# Patient Record
Sex: MALE | Race: BLACK OR AFRICAN AMERICAN | NOT HISPANIC OR LATINO | Employment: OTHER | URBAN - METROPOLITAN AREA
[De-identification: names, ages, dates, MRNs, and addresses within clinical notes are randomized per-mention and may not be internally consistent; named-entity substitution may affect disease eponyms.]

---

## 2025-07-07 ENCOUNTER — HOSPITAL ENCOUNTER (INPATIENT)
Facility: HOSPITAL | Age: 85
LOS: 3 days | Discharge: NURSING, REHAB OR LONG-TERM CARE | DRG: 291 | End: 2025-07-11
Attending: EMERGENCY MEDICINE | Admitting: INTERNAL MEDICINE
Payer: MEDICARE

## 2025-07-07 ENCOUNTER — APPOINTMENT (OUTPATIENT)
Dept: RADIOLOGY | Facility: HOSPITAL | Age: 85
DRG: 291 | End: 2025-07-07
Payer: MEDICARE

## 2025-07-07 ENCOUNTER — APPOINTMENT (OUTPATIENT)
Dept: CARDIOLOGY | Facility: HOSPITAL | Age: 85
DRG: 291 | End: 2025-07-07
Payer: MEDICARE

## 2025-07-07 DIAGNOSIS — I50.9 ACUTE ON CHRONIC CONGESTIVE HEART FAILURE, UNSPECIFIED HEART FAILURE TYPE: Primary | ICD-10-CM

## 2025-07-07 DIAGNOSIS — I42.9 CARDIOMYOPATHY, UNSPECIFIED TYPE (MULTI): ICD-10-CM

## 2025-07-07 LAB
ALBUMIN SERPL BCP-MCNC: 3.7 G/DL (ref 3.4–5)
ALP SERPL-CCNC: 48 U/L (ref 33–136)
ALT SERPL W P-5'-P-CCNC: 21 U/L (ref 10–52)
ANION GAP SERPL CALCULATED.3IONS-SCNC: 13 MMOL/L (ref 10–20)
AST SERPL W P-5'-P-CCNC: 18 U/L (ref 9–39)
BASOPHILS # BLD AUTO: 0.07 X10*3/UL (ref 0–0.1)
BASOPHILS NFR BLD AUTO: 0.6 %
BILIRUB SERPL-MCNC: 0.7 MG/DL (ref 0–1.2)
BNP SERPL-MCNC: 1241 PG/ML (ref 0–99)
BUN SERPL-MCNC: 19 MG/DL (ref 6–23)
CALCIUM SERPL-MCNC: 9.1 MG/DL (ref 8.6–10.3)
CARDIAC TROPONIN I PNL SERPL HS: 59 NG/L (ref 0–20)
CARDIAC TROPONIN I PNL SERPL HS: 60 NG/L (ref 0–20)
CHLORIDE SERPL-SCNC: 103 MMOL/L (ref 98–107)
CO2 SERPL-SCNC: 27 MMOL/L (ref 21–32)
CREAT SERPL-MCNC: 1.02 MG/DL (ref 0.5–1.3)
EGFRCR SERPLBLD CKD-EPI 2021: 72 ML/MIN/1.73M*2
EOSINOPHIL # BLD AUTO: 0.12 X10*3/UL (ref 0–0.4)
EOSINOPHIL NFR BLD AUTO: 1.1 %
ERYTHROCYTE [DISTWIDTH] IN BLOOD BY AUTOMATED COUNT: 15 % (ref 11.5–14.5)
GLUCOSE SERPL-MCNC: 100 MG/DL (ref 74–99)
HCT VFR BLD AUTO: 41.2 % (ref 41–52)
HGB BLD-MCNC: 12.8 G/DL (ref 13.5–17.5)
IMM GRANULOCYTES # BLD AUTO: 0.04 X10*3/UL (ref 0–0.5)
IMM GRANULOCYTES NFR BLD AUTO: 0.4 % (ref 0–0.9)
LYMPHOCYTES # BLD AUTO: 1.58 X10*3/UL (ref 0.8–3)
LYMPHOCYTES NFR BLD AUTO: 14.2 %
MAGNESIUM SERPL-MCNC: 1.94 MG/DL (ref 1.6–2.4)
MCH RBC QN AUTO: 26.7 PG (ref 26–34)
MCHC RBC AUTO-ENTMCNC: 31.1 G/DL (ref 32–36)
MCV RBC AUTO: 86 FL (ref 80–100)
MONOCYTES # BLD AUTO: 1.23 X10*3/UL (ref 0.05–0.8)
MONOCYTES NFR BLD AUTO: 11.1 %
NEUTROPHILS # BLD AUTO: 8.09 X10*3/UL (ref 1.6–5.5)
NEUTROPHILS NFR BLD AUTO: 72.6 %
NRBC BLD-RTO: 0 /100 WBCS (ref 0–0)
PLATELET # BLD AUTO: 345 X10*3/UL (ref 150–450)
POTASSIUM SERPL-SCNC: 3.9 MMOL/L (ref 3.5–5.3)
PROT SERPL-MCNC: 7.4 G/DL (ref 6.4–8.2)
RBC # BLD AUTO: 4.8 X10*6/UL (ref 4.5–5.9)
SODIUM SERPL-SCNC: 139 MMOL/L (ref 136–145)
WBC # BLD AUTO: 11.1 X10*3/UL (ref 4.4–11.3)

## 2025-07-07 PROCEDURE — 85025 COMPLETE CBC W/AUTO DIFF WBC: CPT

## 2025-07-07 PROCEDURE — 83735 ASSAY OF MAGNESIUM: CPT

## 2025-07-07 PROCEDURE — 84484 ASSAY OF TROPONIN QUANT: CPT

## 2025-07-07 PROCEDURE — 71045 X-RAY EXAM CHEST 1 VIEW: CPT

## 2025-07-07 PROCEDURE — 83880 ASSAY OF NATRIURETIC PEPTIDE: CPT

## 2025-07-07 PROCEDURE — 99285 EMERGENCY DEPT VISIT HI MDM: CPT | Performed by: EMERGENCY MEDICINE

## 2025-07-07 PROCEDURE — 93005 ELECTROCARDIOGRAM TRACING: CPT

## 2025-07-07 PROCEDURE — 36415 COLL VENOUS BLD VENIPUNCTURE: CPT

## 2025-07-07 PROCEDURE — 80053 COMPREHEN METABOLIC PANEL: CPT

## 2025-07-07 RX ORDER — FUROSEMIDE 10 MG/ML
40 INJECTION INTRAMUSCULAR; INTRAVENOUS ONCE
Status: COMPLETED | OUTPATIENT
Start: 2025-07-07 | End: 2025-07-08

## 2025-07-07 ASSESSMENT — PAIN - FUNCTIONAL ASSESSMENT: PAIN_FUNCTIONAL_ASSESSMENT: 0-10

## 2025-07-07 ASSESSMENT — PAIN SCALES - GENERAL: PAINLEVEL_OUTOF10: 0 - NO PAIN

## 2025-07-07 NOTE — ED TRIAGE NOTES
Pt to ED via EMS for SOB that started this morning, bilateral leg swelling for past few days and increased weakness and falls for the past few weeks. Pt lives at home with family. Pt was 95% RA. No headache, ear/sinus/throat/chest/abdominal pain, cough, nausea, vomiting, diarrhea, fever, chills, body aches. Some painful urination. Pt with some discharge in right eye that is baseline due to history of operation. Pt said he fell out of bed a few times lately but did not hit head; making jokes in triage.

## 2025-07-08 ENCOUNTER — APPOINTMENT (OUTPATIENT)
Dept: CARDIOLOGY | Facility: HOSPITAL | Age: 85
DRG: 291 | End: 2025-07-08
Payer: MEDICARE

## 2025-07-08 PROBLEM — I50.9 ACUTE ON CHRONIC CONGESTIVE HEART FAILURE, UNSPECIFIED HEART FAILURE TYPE: Status: ACTIVE | Noted: 2025-07-08

## 2025-07-08 LAB
CHOLEST SERPL-MCNC: 126 MG/DL (ref 0–199)
CHOLEST/HDLC SERPL: 2.6 {RATIO}
HDLC SERPL-MCNC: 48.2 MG/DL
HOLD SPECIMEN: NORMAL
LDLC SERPL CALC-MCNC: 66 MG/DL
NON HDL CHOLESTEROL: 78 MG/DL (ref 0–149)
TRIGL SERPL-MCNC: 60 MG/DL (ref 0–149)
TSH SERPL-ACNC: 0.91 MIU/L (ref 0.44–3.98)
VLDL: 12 MG/DL (ref 0–40)

## 2025-07-08 PROCEDURE — 96374 THER/PROPH/DIAG INJ IV PUSH: CPT

## 2025-07-08 PROCEDURE — 80061 LIPID PANEL: CPT | Performed by: INTERNAL MEDICINE

## 2025-07-08 PROCEDURE — 84443 ASSAY THYROID STIM HORMONE: CPT | Performed by: INTERNAL MEDICINE

## 2025-07-08 PROCEDURE — 1200000002 HC GENERAL ROOM WITH TELEMETRY DAILY

## 2025-07-08 PROCEDURE — 2500000001 HC RX 250 WO HCPCS SELF ADMINISTERED DRUGS (ALT 637 FOR MEDICARE OP): Performed by: INTERNAL MEDICINE

## 2025-07-08 PROCEDURE — 2500000004 HC RX 250 GENERAL PHARMACY W/ HCPCS (ALT 636 FOR OP/ED): Mod: JW | Performed by: INTERNAL MEDICINE

## 2025-07-08 PROCEDURE — 2500000001 HC RX 250 WO HCPCS SELF ADMINISTERED DRUGS (ALT 637 FOR MEDICARE OP): Performed by: NURSE PRACTITIONER

## 2025-07-08 PROCEDURE — 36415 COLL VENOUS BLD VENIPUNCTURE: CPT | Performed by: INTERNAL MEDICINE

## 2025-07-08 PROCEDURE — 2500000004 HC RX 250 GENERAL PHARMACY W/ HCPCS (ALT 636 FOR OP/ED)

## 2025-07-08 PROCEDURE — 97165 OT EVAL LOW COMPLEX 30 MIN: CPT | Mod: GO

## 2025-07-08 PROCEDURE — 99232 SBSQ HOSP IP/OBS MODERATE 35: CPT | Performed by: INTERNAL MEDICINE

## 2025-07-08 PROCEDURE — C8929 TTE W OR WO FOL WCON,DOPPLER: HCPCS

## 2025-07-08 PROCEDURE — 2500000004 HC RX 250 GENERAL PHARMACY W/ HCPCS (ALT 636 FOR OP/ED): Performed by: INTERNAL MEDICINE

## 2025-07-08 PROCEDURE — 99223 1ST HOSP IP/OBS HIGH 75: CPT | Performed by: INTERNAL MEDICINE

## 2025-07-08 PROCEDURE — 97161 PT EVAL LOW COMPLEX 20 MIN: CPT | Mod: GP | Performed by: PHYSICAL THERAPIST

## 2025-07-08 RX ORDER — CLOPIDOGREL BISULFATE 75 MG/1
75 TABLET ORAL DAILY
COMMUNITY

## 2025-07-08 RX ORDER — FUROSEMIDE 10 MG/ML
40 INJECTION INTRAMUSCULAR; INTRAVENOUS DAILY
Status: DISCONTINUED | OUTPATIENT
Start: 2025-07-08 | End: 2025-07-09

## 2025-07-08 RX ORDER — CARVEDILOL 3.12 MG/1
3.12 TABLET ORAL
Status: DISCONTINUED | OUTPATIENT
Start: 2025-07-08 | End: 2025-07-11 | Stop reason: HOSPADM

## 2025-07-08 RX ORDER — ACETAMINOPHEN 500 MG
5 TABLET ORAL NIGHTLY PRN
Status: DISCONTINUED | OUTPATIENT
Start: 2025-07-08 | End: 2025-07-11 | Stop reason: HOSPADM

## 2025-07-08 RX ORDER — ONDANSETRON 4 MG/1
4 TABLET, ORALLY DISINTEGRATING ORAL EVERY 8 HOURS PRN
Status: DISCONTINUED | OUTPATIENT
Start: 2025-07-08 | End: 2025-07-11 | Stop reason: HOSPADM

## 2025-07-08 RX ORDER — AMLODIPINE BESYLATE 5 MG/1
5 TABLET ORAL DAILY
COMMUNITY
End: 2025-07-11 | Stop reason: HOSPADM

## 2025-07-08 RX ORDER — ONDANSETRON HYDROCHLORIDE 2 MG/ML
4 INJECTION, SOLUTION INTRAVENOUS EVERY 6 HOURS PRN
Status: DISCONTINUED | OUTPATIENT
Start: 2025-07-08 | End: 2025-07-11 | Stop reason: HOSPADM

## 2025-07-08 RX ORDER — CHOLECALCIFEROL (VITAMIN D3) 50 MCG
50 TABLET ORAL DAILY
COMMUNITY

## 2025-07-08 RX ORDER — ROSUVASTATIN CALCIUM 5 MG/1
5 TABLET, COATED ORAL DAILY
COMMUNITY

## 2025-07-08 RX ORDER — FINASTERIDE 5 MG/1
5 TABLET, FILM COATED ORAL DAILY
COMMUNITY

## 2025-07-08 RX ORDER — HEPARIN SODIUM 5000 [USP'U]/ML
5000 INJECTION, SOLUTION INTRAVENOUS; SUBCUTANEOUS 2 TIMES DAILY
Status: DISCONTINUED | OUTPATIENT
Start: 2025-07-08 | End: 2025-07-11 | Stop reason: HOSPADM

## 2025-07-08 RX ORDER — ASPIRIN 81 MG/1
81 TABLET ORAL DAILY
COMMUNITY

## 2025-07-08 RX ORDER — TAMSULOSIN HYDROCHLORIDE 0.4 MG/1
0.4 CAPSULE ORAL DAILY
COMMUNITY

## 2025-07-08 RX ORDER — LISINOPRIL 2.5 MG/1
2.5 TABLET ORAL DAILY
Status: DISCONTINUED | OUTPATIENT
Start: 2025-07-08 | End: 2025-07-11 | Stop reason: HOSPADM

## 2025-07-08 RX ORDER — ALUMINUM HYDROXIDE, MAGNESIUM HYDROXIDE, AND SIMETHICONE 1200; 120; 1200 MG/30ML; MG/30ML; MG/30ML
30 SUSPENSION ORAL 4 TIMES DAILY PRN
Status: DISCONTINUED | OUTPATIENT
Start: 2025-07-08 | End: 2025-07-11 | Stop reason: HOSPADM

## 2025-07-08 RX ORDER — ACETAMINOPHEN 325 MG/1
650 TABLET ORAL EVERY 6 HOURS PRN
Status: DISCONTINUED | OUTPATIENT
Start: 2025-07-08 | End: 2025-07-11 | Stop reason: HOSPADM

## 2025-07-08 RX ADMIN — CARVEDILOL 3.12 MG: 3.12 TABLET, FILM COATED ORAL at 09:17

## 2025-07-08 RX ADMIN — FUROSEMIDE 40 MG: 10 INJECTION, SOLUTION INTRAMUSCULAR; INTRAVENOUS at 09:15

## 2025-07-08 RX ADMIN — LISINOPRIL 2.5 MG: 2.5 TABLET ORAL at 09:15

## 2025-07-08 RX ADMIN — FUROSEMIDE 40 MG: 10 INJECTION, SOLUTION INTRAMUSCULAR; INTRAVENOUS at 00:14

## 2025-07-08 RX ADMIN — ACETAMINOPHEN 650 MG: 325 TABLET ORAL at 09:24

## 2025-07-08 RX ADMIN — HEPARIN SODIUM 5000 UNITS: 5000 INJECTION, SOLUTION INTRAVENOUS; SUBCUTANEOUS at 09:15

## 2025-07-08 RX ADMIN — PERFLUTREN 2 ML OF DILUTION: 6.52 INJECTION, SUSPENSION INTRAVENOUS at 13:43

## 2025-07-08 RX ADMIN — CARVEDILOL 3.12 MG: 3.12 TABLET, FILM COATED ORAL at 15:45

## 2025-07-08 RX ADMIN — HEPARIN SODIUM 5000 UNITS: 5000 INJECTION, SOLUTION INTRAVENOUS; SUBCUTANEOUS at 21:01

## 2025-07-08 RX ADMIN — Medication 5 MG: at 21:01

## 2025-07-08 SDOH — ECONOMIC STABILITY: INCOME INSECURITY: IN THE PAST 12 MONTHS HAS THE ELECTRIC, GAS, OIL, OR WATER COMPANY THREATENED TO SHUT OFF SERVICES IN YOUR HOME?: NO

## 2025-07-08 SDOH — SOCIAL STABILITY: SOCIAL NETWORK
DO YOU BELONG TO ANY CLUBS OR ORGANIZATIONS SUCH AS CHURCH GROUPS, UNIONS, FRATERNAL OR ATHLETIC GROUPS, OR SCHOOL GROUPS?: NO

## 2025-07-08 SDOH — SOCIAL STABILITY: SOCIAL INSECURITY: HAVE YOU HAD ANY THOUGHTS OF HARMING ANYONE ELSE?: NO

## 2025-07-08 SDOH — ECONOMIC STABILITY: FOOD INSECURITY: WITHIN THE PAST 12 MONTHS, THE FOOD YOU BOUGHT JUST DIDN'T LAST AND YOU DIDN'T HAVE MONEY TO GET MORE.: NEVER TRUE

## 2025-07-08 SDOH — HEALTH STABILITY: PHYSICAL HEALTH
HOW OFTEN DO YOU NEED TO HAVE SOMEONE HELP YOU WHEN YOU READ INSTRUCTIONS, PAMPHLETS, OR OTHER WRITTEN MATERIAL FROM YOUR DOCTOR OR PHARMACY?: NEVER

## 2025-07-08 SDOH — HEALTH STABILITY: MENTAL HEALTH: HOW OFTEN DO YOU HAVE SIX OR MORE DRINKS ON ONE OCCASION?: NEVER

## 2025-07-08 SDOH — SOCIAL STABILITY: SOCIAL INSECURITY: ARE YOU MARRIED, WIDOWED, DIVORCED, SEPARATED, NEVER MARRIED, OR LIVING WITH A PARTNER?: MARRIED

## 2025-07-08 SDOH — SOCIAL STABILITY: SOCIAL INSECURITY: HAVE YOU HAD THOUGHTS OF HARMING ANYONE ELSE?: NO

## 2025-07-08 SDOH — SOCIAL STABILITY: SOCIAL INSECURITY
WITHIN THE LAST YEAR, HAVE YOU BEEN RAPED OR FORCED TO HAVE ANY KIND OF SEXUAL ACTIVITY BY YOUR PARTNER OR EX-PARTNER?: NO

## 2025-07-08 SDOH — HEALTH STABILITY: MENTAL HEALTH: HOW MANY DRINKS CONTAINING ALCOHOL DO YOU HAVE ON A TYPICAL DAY WHEN YOU ARE DRINKING?: PATIENT DOES NOT DRINK

## 2025-07-08 SDOH — ECONOMIC STABILITY: HOUSING INSECURITY: IN THE PAST 12 MONTHS, HOW MANY TIMES HAVE YOU MOVED WHERE YOU WERE LIVING?: 0

## 2025-07-08 SDOH — ECONOMIC STABILITY: HOUSING INSECURITY: IN THE LAST 12 MONTHS, WAS THERE A TIME WHEN YOU WERE NOT ABLE TO PAY THE MORTGAGE OR RENT ON TIME?: NO

## 2025-07-08 SDOH — HEALTH STABILITY: MENTAL HEALTH
DO YOU FEEL STRESS - TENSE, RESTLESS, NERVOUS, OR ANXIOUS, OR UNABLE TO SLEEP AT NIGHT BECAUSE YOUR MIND IS TROUBLED ALL THE TIME - THESE DAYS?: NOT AT ALL

## 2025-07-08 SDOH — ECONOMIC STABILITY: TRANSPORTATION INSECURITY: IN THE PAST 12 MONTHS, HAS LACK OF TRANSPORTATION KEPT YOU FROM MEDICAL APPOINTMENTS OR FROM GETTING MEDICATIONS?: NO

## 2025-07-08 SDOH — ECONOMIC STABILITY: FOOD INSECURITY: WITHIN THE PAST 12 MONTHS, YOU WORRIED THAT YOUR FOOD WOULD RUN OUT BEFORE YOU GOT THE MONEY TO BUY MORE.: NEVER TRUE

## 2025-07-08 SDOH — SOCIAL STABILITY: SOCIAL INSECURITY
WITHIN THE LAST YEAR, HAVE YOU BEEN KICKED, HIT, SLAPPED, OR OTHERWISE PHYSICALLY HURT BY YOUR PARTNER OR EX-PARTNER?: NO

## 2025-07-08 SDOH — HEALTH STABILITY: MENTAL HEALTH: EXPERIENCED ANY OF THE FOLLOWING LIFE EVENTS: DEATH OF FAMILY/FRIEND

## 2025-07-08 SDOH — HEALTH STABILITY: MENTAL HEALTH: HOW OFTEN DO YOU HAVE A DRINK CONTAINING ALCOHOL?: NEVER

## 2025-07-08 SDOH — HEALTH STABILITY: PHYSICAL HEALTH: ON AVERAGE, HOW MANY MINUTES DO YOU ENGAGE IN EXERCISE AT THIS LEVEL?: 0 MIN

## 2025-07-08 SDOH — SOCIAL STABILITY: SOCIAL INSECURITY: ARE YOU OR HAVE YOU BEEN THREATENED OR ABUSED PHYSICALLY, EMOTIONALLY, OR SEXUALLY BY ANYONE?: NO

## 2025-07-08 SDOH — SOCIAL STABILITY: SOCIAL NETWORK: HOW OFTEN DO YOU ATTEND MEETINGS OF THE CLUBS OR ORGANIZATIONS YOU BELONG TO?: NEVER

## 2025-07-08 SDOH — ECONOMIC STABILITY: FOOD INSECURITY: HOW HARD IS IT FOR YOU TO PAY FOR THE VERY BASICS LIKE FOOD, HOUSING, MEDICAL CARE, AND HEATING?: NOT HARD AT ALL

## 2025-07-08 SDOH — ECONOMIC STABILITY: HOUSING INSECURITY: AT ANY TIME IN THE PAST 12 MONTHS, WERE YOU HOMELESS OR LIVING IN A SHELTER (INCLUDING NOW)?: NO

## 2025-07-08 SDOH — SOCIAL STABILITY: SOCIAL INSECURITY: WERE YOU ABLE TO COMPLETE ALL THE BEHAVIORAL HEALTH SCREENINGS?: YES

## 2025-07-08 SDOH — SOCIAL STABILITY: SOCIAL INSECURITY: WITHIN THE LAST YEAR, HAVE YOU BEEN AFRAID OF YOUR PARTNER OR EX-PARTNER?: NO

## 2025-07-08 SDOH — SOCIAL STABILITY: SOCIAL INSECURITY: WITHIN THE LAST YEAR, HAVE YOU BEEN HUMILIATED OR EMOTIONALLY ABUSED IN OTHER WAYS BY YOUR PARTNER OR EX-PARTNER?: NO

## 2025-07-08 SDOH — SOCIAL STABILITY: SOCIAL NETWORK
IN A TYPICAL WEEK, HOW MANY TIMES DO YOU TALK ON THE PHONE WITH FAMILY, FRIENDS, OR NEIGHBORS?: MORE THAN THREE TIMES A WEEK

## 2025-07-08 SDOH — SOCIAL STABILITY: SOCIAL INSECURITY: DO YOU FEEL ANYONE HAS EXPLOITED OR TAKEN ADVANTAGE OF YOU FINANCIALLY OR OF YOUR PERSONAL PROPERTY?: NO

## 2025-07-08 SDOH — SOCIAL STABILITY: SOCIAL INSECURITY: ABUSE: ADULT

## 2025-07-08 SDOH — SOCIAL STABILITY: SOCIAL INSECURITY: DO YOU FEEL UNSAFE GOING BACK TO THE PLACE WHERE YOU ARE LIVING?: NO

## 2025-07-08 SDOH — SOCIAL STABILITY: SOCIAL INSECURITY: POSSIBLE ABUSE REPORTED TO:: ADVOCATE

## 2025-07-08 SDOH — HEALTH STABILITY: PHYSICAL HEALTH: ON AVERAGE, HOW MANY DAYS PER WEEK DO YOU ENGAGE IN MODERATE TO STRENUOUS EXERCISE (LIKE A BRISK WALK)?: 0 DAYS

## 2025-07-08 SDOH — SOCIAL STABILITY: SOCIAL NETWORK: HOW OFTEN DO YOU GET TOGETHER WITH FRIENDS OR RELATIVES?: MORE THAN THREE TIMES A WEEK

## 2025-07-08 SDOH — SOCIAL STABILITY: SOCIAL INSECURITY: HAS ANYONE EVER THREATENED TO HURT YOUR FAMILY OR YOUR PETS?: NO

## 2025-07-08 SDOH — SOCIAL STABILITY: SOCIAL NETWORK: HOW OFTEN DO YOU ATTEND CHURCH OR RELIGIOUS SERVICES?: 1 TO 4 TIMES PER YEAR

## 2025-07-08 SDOH — SOCIAL STABILITY: SOCIAL INSECURITY: ARE THERE ANY APPARENT SIGNS OF INJURIES/BEHAVIORS THAT COULD BE RELATED TO ABUSE/NEGLECT?: NO

## 2025-07-08 SDOH — SOCIAL STABILITY: SOCIAL INSECURITY: DOES ANYONE TRY TO KEEP YOU FROM HAVING/CONTACTING OTHER FRIENDS OR DOING THINGS OUTSIDE YOUR HOME?: NO

## 2025-07-08 ASSESSMENT — COGNITIVE AND FUNCTIONAL STATUS - GENERAL
DAILY ACTIVITIY SCORE: 18
PERSONAL GROOMING: A LITTLE
DAILY ACTIVITIY SCORE: 13
PERSONAL GROOMING: A LITTLE
STANDING UP FROM CHAIR USING ARMS: A LOT
MOBILITY SCORE: 15
STANDING UP FROM CHAIR USING ARMS: A LOT
DRESSING REGULAR UPPER BODY CLOTHING: A LITTLE
MOVING FROM LYING ON BACK TO SITTING ON SIDE OF FLAT BED WITH BEDRAILS: A LITTLE
PERSONAL GROOMING: A LITTLE
TOILETING: A LITTLE
TURNING FROM BACK TO SIDE WHILE IN FLAT BAD: A LITTLE
MOVING TO AND FROM BED TO CHAIR: A LITTLE
DRESSING REGULAR UPPER BODY CLOTHING: A LITTLE
STANDING UP FROM CHAIR USING ARMS: A LOT
MOBILITY SCORE: 15
MOVING FROM LYING ON BACK TO SITTING ON SIDE OF FLAT BED WITH BEDRAILS: A LITTLE
CLIMB 3 TO 5 STEPS WITH RAILING: A LOT
MOVING FROM LYING ON BACK TO SITTING ON SIDE OF FLAT BED WITH BEDRAILS: A LITTLE
HELP NEEDED FOR BATHING: A LOT
TOILETING: A LITTLE
MOVING TO AND FROM BED TO CHAIR: A LOT
EATING MEALS: A LITTLE
TOILETING: TOTAL
MOBILITY SCORE: 14
WALKING IN HOSPITAL ROOM: A LOT
MOVING TO AND FROM BED TO CHAIR: A LITTLE
PERSONAL GROOMING: A LITTLE
HELP NEEDED FOR BATHING: A LITTLE
TURNING FROM BACK TO SIDE WHILE IN FLAT BAD: A LOT
DRESSING REGULAR UPPER BODY CLOTHING: A LITTLE
STANDING UP FROM CHAIR USING ARMS: A LITTLE
HELP NEEDED FOR BATHING: A LITTLE
DAILY ACTIVITIY SCORE: 18
DRESSING REGULAR LOWER BODY CLOTHING: TOTAL
DRESSING REGULAR LOWER BODY CLOTHING: A LITTLE
EATING MEALS: A LITTLE
DRESSING REGULAR LOWER BODY CLOTHING: A LITTLE
EATING MEALS: A LITTLE
DRESSING REGULAR UPPER BODY CLOTHING: A LITTLE
TURNING FROM BACK TO SIDE WHILE IN FLAT BAD: A LITTLE
CLIMB 3 TO 5 STEPS WITH RAILING: A LOT
DRESSING REGULAR LOWER BODY CLOTHING: A LITTLE
PATIENT BASELINE BEDBOUND: NO
CLIMB 3 TO 5 STEPS WITH RAILING: A LOT
WALKING IN HOSPITAL ROOM: A LOT
CLIMB 3 TO 5 STEPS WITH RAILING: A LOT
WALKING IN HOSPITAL ROOM: A LOT
WALKING IN HOSPITAL ROOM: A LOT
DAILY ACTIVITIY SCORE: 18
MOVING TO AND FROM BED TO CHAIR: A LITTLE
MOVING FROM LYING ON BACK TO SITTING ON SIDE OF FLAT BED WITH BEDRAILS: A LITTLE
HELP NEEDED FOR BATHING: A LITTLE
EATING MEALS: A LITTLE
TURNING FROM BACK TO SIDE WHILE IN FLAT BAD: A LITTLE
TOILETING: A LITTLE
MOBILITY SCORE: 15

## 2025-07-08 ASSESSMENT — PAIN SCALES - GENERAL
PAINLEVEL_OUTOF10: 0 - NO PAIN
PAINLEVEL_OUTOF10: 3
PAINLEVEL_OUTOF10: 0 - NO PAIN

## 2025-07-08 ASSESSMENT — PATIENT HEALTH QUESTIONNAIRE - PHQ9
1. LITTLE INTEREST OR PLEASURE IN DOING THINGS: NOT AT ALL
SUM OF ALL RESPONSES TO PHQ9 QUESTIONS 1 & 2: 0
2. FEELING DOWN, DEPRESSED OR HOPELESS: NOT AT ALL

## 2025-07-08 ASSESSMENT — ENCOUNTER SYMPTOMS
GASTROINTESTINAL NEGATIVE: 1
PALPITATIONS: 0
SHORTNESS OF BREATH: 1
IRREGULAR HEARTBEAT: 0
NEUROLOGICAL NEGATIVE: 1
ALLERGIC/IMMUNOLOGIC NEGATIVE: 1
DYSPNEA ON EXERTION: 0
CONSTITUTIONAL NEGATIVE: 1
PSYCHIATRIC NEGATIVE: 1
SYNCOPE: 0
ENDOCRINE NEGATIVE: 1
EYES NEGATIVE: 1
HEMATOLOGIC/LYMPHATIC NEGATIVE: 1
MUSCULOSKELETAL NEGATIVE: 1

## 2025-07-08 ASSESSMENT — ACTIVITIES OF DAILY LIVING (ADL)
HEARING - LEFT EAR: DIFFICULTY WITH NOISE
BATHING: NEEDS ASSISTANCE
FEEDING YOURSELF: NEEDS ASSISTANCE
HEARING - RIGHT EAR: DIFFICULTY WITH NOISE
ADL_ASSISTANCE: NEEDS ASSISTANCE
GROOMING: NEEDS ASSISTANCE
BATHING_ASSISTANCE: MODERATE
DRESSING YOURSELF: NEEDS ASSISTANCE
LACK_OF_TRANSPORTATION: NO
ADEQUATE_TO_COMPLETE_ADL: YES
JUDGMENT_ADEQUATE_SAFELY_COMPLETE_DAILY_ACTIVITIES: YES
TOILETING: NEEDS ASSISTANCE
PATIENT'S MEMORY ADEQUATE TO SAFELY COMPLETE DAILY ACTIVITIES?: YES
WALKS IN HOME: NEEDS ASSISTANCE
LACK_OF_TRANSPORTATION: NO

## 2025-07-08 ASSESSMENT — LIFESTYLE VARIABLES
HOW MANY STANDARD DRINKS CONTAINING ALCOHOL DO YOU HAVE ON A TYPICAL DAY: PATIENT DOES NOT DRINK
SKIP TO QUESTIONS 9-10: 1
HOW OFTEN DO YOU HAVE A DRINK CONTAINING ALCOHOL: NEVER
HOW OFTEN DO YOU HAVE 6 OR MORE DRINKS ON ONE OCCASION: NEVER
SUBSTANCE_ABUSE_PAST_12_MONTHS: NO
AUDIT-C TOTAL SCORE: 0
SKIP TO QUESTIONS 9-10: 1
AUDIT-C TOTAL SCORE: 0
PRESCIPTION_ABUSE_PAST_12_MONTHS: NO
AUDIT-C TOTAL SCORE: 0

## 2025-07-08 ASSESSMENT — PAIN - FUNCTIONAL ASSESSMENT
PAIN_FUNCTIONAL_ASSESSMENT: 0-10

## 2025-07-08 ASSESSMENT — PAIN DESCRIPTION - DESCRIPTORS: DESCRIPTORS: ACHING

## 2025-07-08 NOTE — PROGRESS NOTES
Physical Therapy    Physical Therapy Evaluation    Patient Name: Rick Cabral  MRN: [id number]  Department: [address]  Room: [address] [place], [place] [address]  Today's Date: 7/8/2025   Time Calculation  Start Time: 1050  Stop Time: 1109  Time Calculation (min): 19 min    Assessment/Plan   PT Assessment  PT Assessment Results: Decreased strength, Decreased mobility, Decreased endurance, Impaired balance, Decreased coordination, Decreased cognition, Decreased safety awareness, Impaired hearing  Rehab Prognosis: Good  Barriers to Discharge Home: Caregiver assistance, Cognition needs, Physical needs  Caregiver Assistance: Caregiver assistance needed per identified barriers - however, level of patient's required assistance exceeds assistance available at home  Cognition Needs: 24hr supervision for safety awareness needed  Physical Needs: Stair navigation into home limited by function/safety, Ambulating household distances limited by function/safety, 24hr mobility assistance needed, High falls risk due to function or environment  Strengths: Support of extended family/friends, Living arrangement secure, Attitude of self  Barriers to Participation: Comorbidities  End of Session Communication: Bedside nurse  Assessment Comment: He presented with the above listed impairments (see Assessment Results). Pt required moderately increased assist during functional mobility; an increase from his reported baseline. Pt would benefit from continued skilled PT services for maximizing independence and safety prior to & after discharge (MODERATE intensity).  End of Session Patient Position: Bed, 3 rail up, Alarm off, caregiver present (RN at bedside for hygiene)    IP OR SWING BED PT PLAN  Inpatient or Swing Bed: Inpatient    PT Plan  Treatment/Interventions: Bed mobility, Transfer training, Gait training, Strengthening, Therapeutic exercise, Therapeutic activity, Balance training  PT Plan: Ongoing PT  PT Frequency: 4 times per week (during this acute inpatient  hospitalization)  PT Discharge Recommendations: Moderate intensity level of continued care (Based on current functional status and rehab potential, pt was anticipated to tolerate and benefit from >/=5 days per week of skilled rehabilitative therapy after discharge from this acute inpatient hospitalization.)  PT Recommended Transfer Status: Assist x1, Assist x2, Assistive device (FWW)  PT - OK to Discharge: Yes      Subjective     PT Visit Info:  PT Received On: 07/08/25    General Visit Information:  General  Reason for Referral: Impaired functional mobility due to decreased muscular strength. This 85 year old was admitted for acute on chronic CHF.  Past Medical History Relevant to Rehab: BPH,CVA, systolic & diastolic HF, HTN  Family/Caregiver Present: No  Prior to Session Communication: Bedside nurse  Patient Position Received: Bed, 3 rail up, Alarm on  General Comment: Cleared by RN for participation. Pt agreed to session and was fully engaged throughout.    Home Living:  Pt was a questionable historian and there was no home set up or PLOF in chart review.  Type of Home: House  Lives With: Adult children (son)  Home Adaptive Equipment: Walker rolling or standard (FWW; slept in regular bed with 1 bed rail)  Home Layout: Able to live on main level with bedroom/bathroom, Full bath main level  Entrance Stairs-Rails: Both  Entrance Stairs-Number of Steps: 2  Bathroom Shower/Tub:  (Pt unable to report)  Home Living Comments: he stated son, dtr & ex-wife were supportive    Prior Level of Function:  Pt was a questionable historian and there was no home set up or PLOF in chart review.  Receives Help From: Family (son, dtr, ex-wife)  ADL Assistance: Needs assistance (for batheing and dressing. Ex-wife assisted with baths.)  Homemaking Assistance: Needs assistance (dependent on family)  Ambulatory Assistance: Independent (Mod I with FWW. Reported 2 falls (out of bed) in the past 6 months.)  Prior Function Comments: (-)  "driving; son, dtr, or ex-wife provided transportation    Precautions:  Precautions  Hearing/Visual Limitations: midly [place], no hearing aids; reading glasses  Medical Precautions: Fall precautions  Precautions Comment: Paz, telemetry, CHASTITY BARNETT (hep locked)       Objective   Pain:  Pain Assessment  0-10 (Numeric) Pain Score: 0 - No pain    Cognition:  Cognition  Overall Cognitive Status: Impaired  Orientation Level: Disoriented to situation, Disoriented to time, Disoriented to place (stated correct month but \"85\" for year; able to [facility] but then said in \"[place]\")    General Assessments:  General Observation  General Observation: BLE/distal lower leg & foot edema: +2    Activity Tolerance  Endurance: Decreased tolerance for upright activites    Sensation  Sensation Comment: Not tested; pt denied paresthesias now and at baseline    ROM  BLE AROM: grossly WFL via observation    Strength  BLE: hip flexors & DF 3+/5, quads 4-/5    Motor Control  Not formally assessed.  Movements are Fluid and Coordinated: No (slowed rate and decreased accuracy of global movements)    Postural Control  Postural Control: Within Functional Limits  Posture Comment: fwd head posture, protracted scapulae    Static Sitting Balance  Static Sitting-Balance Support: Bilateral upper extremity supported, Feet supported  Static Sitting-Level of Assistance: Close supervision  Dynamic Sitting Balance  Dynamic Sitting-Balance Support: Bilateral upper extremity supported (unilateral LE supported)  Dynamic Sitting-Level of Assistance: Contact guard (limited assessment)    Static Standing Balance  Static Standing-Balance Support: Bilateral upper extremity supported (FWW)  Static Standing-Level of Assistance: Minimum assistance  Dynamic Standing Balance  Dynamic Standing-Balance Support: Bilateral upper extremity supported (FWW)  Dynamic Standing-Level of Assistance: Moderate assistance    Functional Assessments:  Bed Mobility  Bed " Mobility: Yes  Bed Mobility 1  Bed Mobility 1: Supine to sitting  Level of Assistance 1: Moderate assistance (assist to rotate pelvis via draw sheet. Minimal VCs for sequencing. HOB elevated >/=40° and used L bed rail.)  Bed Mobility 2  Bed Mobility  2: Sitting to supine  Level of Assistance 2: Minimum assistance (to elevate LLE. Minimal VCs for initiaiton. Bed flat, no rail; toward L side.)    Transfers  Transfer: Yes  Transfer 1  Technique 1: Sit to stand  Transfer Device 1: Walker (FWW)  Transfer Level of Assistance 1: Moderate assistance (assist for lifting torque. Moderate verbal/tactile cues for walker safety/BUE start position. x1 trial at EOB.)  Transfers 2  Technique 2: Stand to sit  Transfer Device 2: Walker  Transfer Level of Assistance 2: Moderate assistance (assist for LUE placement and increased eccentric control. Moderate VCs for walker safety/BUE placement.)    Ambulation/Gait Training  Ambulation/Gait Training Performed: Yes  Ambulation/Gait Training 1  Surface 1: Level tile  Device 1: Rolling walker  Assistance 1: Moderate assistance (assist for steadying at trunk and walker management. Moderate VCs for sequencing, increased distance & increased trunk/cervical extension.)  Quality of Gait 1: Forward flexed posture, Shuffling gait, Decreased step length (Pt ambulated 2 ft laterally toward L side at EOB with short bilateral step width, slow velocity, and decreased bilateral foot clearance with skimming. No overt threat for LOB. Trial ended when pt started to have BM.)     Outcome Measures:  [facility] Basic Mobility  Turning from your back to your side while in a flat bed without using bedrails: A little  Moving from lying on your back to sitting on the side of a flat bed without using bedrails: A lot  Moving to and from bed to chair (including a wheelchair): A lot  Standing up from a chair using your arms (e.g. wheelchair or bedside chair): A little  To walk in hospital room: A lot  Climbing 3-5 steps  with railing: A lot  Basic Mobility - Total Score: 14    Encounter Problems       Encounter Problems (Active)       PT Problem       Pt will transition supine<>sit with mod I.        Start:  07/08/25    Expected End:  07/27/25            Pt will transfer sit<>stand with FWW & close S.       Start:  07/08/25    Expected End:  07/27/25            Pt will ambulate >/=25 ft with FWW & CGA.       Start:  07/08/25    Expected End:  07/27/25                   Education Documentation  Mobility Training, taught by Kelly Diaz PT at 7/8/2025 12:02 PM.  Learner: Patient  Readiness: Acceptance  Method: Explanation  Response: Needs Reinforcement, Verbalizes Understanding  Comment: Fall precautions; PT role; POC; walker safety    Education Comments  No comments found.

## 2025-07-08 NOTE — PROGRESS NOTES
Evaluation    Patient Name: Rick Cabral  MRN: [id number]  Department: [address]  Room: [address] [place] [address]  Today's Date: 7/8/2025  Time Calculation  Start Time: 1411  Stop Time: 1426  Time Calculation (min): 15 min    Assessment  IP OT Assessment  OT Assessment: 84 yo male with SOB, LE edema, weakness, and falls admits for acute on chronic CHF.  On eval, patient presents with significant functional deficits d/t impaired cognition, decreased activity tolerance, and generalized weakness.  Pt requires OT services to provide ADL retraining utilizing compensatory techniques and progressive strengthening in order to increase functional capacity and safety with mobility prior to returning home.  Prognosis: Good  Barriers to Discharge Home: Caregiver assistance, Cognition needs, Physical needs  Caregiver Assistance: Caregiver assistance needed per identified barriers - however, level of patient's required assistance exceeds assistance available at home  Cognition Needs: Insight of patient limited regarding functional ability/needs  Physical Needs: Stair navigation into home limited by function/safety, Ambulating household distances limited by function/safety, 24hr mobility assistance needed, 24hr ADL assistance needed, High falls risk due to function or environment  Evaluation/Treatment Tolerance: Patient limited by fatigue  Medical Staff Made Aware: Yes  End of Session Communication: Bedside nurse  End of Session Patient Position: Bed, 3 rail up, Alarm on    Plan:  Treatment Interventions: ADL retraining, Functional transfer training, UE strengthening/ROM, Endurance training, Cognitive reorientation, Patient/family training, Equipment evaluation/education, Compensatory technique education  OT Frequency: 4 times per week (during acute care hospitalization)  OT Discharge Recommendations: Moderate intensity level of continued care (Based on current functional status and rehab potential, patient is anticipated to tolerate and benefit from  "5 or more days per week of skilled rehabilitative therapy after discharge from this acute inpatient hospitalization.)  Equipment Recommended upon Discharge: Wheeled walker  OT Recommended Transfer Status: Assist of 1 (for transfers/ADLs, assist of 2 for mobility)  OT - OK to Discharge: Yes      Subjective   Current Problem:  1. Acute on chronic congestive heart failure, unspecified heart failure type  Transthoracic Echo Complete    Transthoracic Echo Complete        OT Visit Info:  OT Received On: 07/08/25    General Visit Info:  General  Reason for Referral: Impaired ADLs d/t acute on chronic CHF  Referred By: Dr Palma  Past Medical History Relevant to Rehab: BPH,CVA, systolic & diastolic HF, HTN, HLD, nicotine dependence  Family/Caregiver Present: No  Prior to Session Communication: Bedside nurse  Patient Position Received: Bed, 3 rail up, Alarm off, not on at start of session  Preferred Learning Style: verbal, visual  General Comment: Cleared by nursing and agreeable for therapy    Precautions:  Hearing/Visual Limitations: mildly [place]  Medical Precautions: Fall precautions    Pain:  Pain Assessment  Pain Assessment: 0-10  0-10 (Numeric) Pain Score: 0 - No pain      Objective   Cognition:  Overall Cognitive Status: Impaired  Orientation Level: Disoriented to time, Disoriented to situation, Disoriented to place (\"[facility]\" for time, \"[place]\" for place)  Following Commands: Follows one step commands with increased time  Insight: Moderate  Impulsive: Mildly  Processing Speed: Delayed    Home Living:  Type of Home: House  Lives With: Adult children (Son)  Home Adaptive Equipment: Walker rolling or standard, Cane  Home Layout: Multi-level, Able to live on main level with bedroom/bathroom  Home Access: Stairs to enter with rails  Entrance Stairs-Rails: Both  Entrance Stairs-Number of Steps: 3  Bathroom Shower/Tub: Walk-in shower  Bathroom Toilet: Standard  Bathroom Equipment: Grab bars in shower (Shower " seat)  Home Living Comments: Information collected from patient who is a questionable historian     Prior Function:  Level of [place]: Needs assistance with ADLs, Needs assistance with homemaking  Receives Help From: Family (assist from spouse for bathing per patient)  Homemaking Assistance:  (assume family completes)  Ambulatory Assistance: Independent (with a 2 wheeled walker per patient)  Vocational: Retired ()  Prior Function Comments: Family provides transportation    ADL:  Eating Assistance: Stand by  Eating Deficit: Setup  Grooming Assistance: Stand by  Grooming Deficit: Setup, Supervision/safety  Bathing Assistance: Moderate  Bathing Deficit: Right lower leg including foot, Left lower leg including foot, Buttocks  UE Dressing Assistance: Minimal  UE Dressing Deficit: Pull around back  LE Dressing Assistance: Total  LE Dressing Deficit:  (donning/doffing socks - remainder of ADLs = per clinical judgement this date)  Toileting Assistance with Device: Total  Toileting Deficit: Urinary Catheter (+purewick)    Activity Tolerance:  Endurance: Decreased tolerance for upright activites    Bed Mobility/Transfers: Bed Mobility 1  Bed Mobility 1: Supine to sitting  Level of Assistance 1: Minimum assistance  Bed Mobility Comments 1: assist with trunk toward the left side of bed with head elevated ~30 degrees  Bed Mobility 2  Bed Mobility  2: Sitting to supine  Level of Assistance 2: Moderate assistance  Bed Mobility Comments 2: assist with BLEs    Transfer 1  Transfer From 1: Bed to  Transfer to 1: Stand  Technique 1: Sit to stand, Stand to sit  Transfer Device 1: Walker  Transfer Level of Assistance 1: Moderate assistance  Trials/Comments 1: from elevated bed height with one hand on stabilized walker    Sensation:  Light Touch: No apparent deficits  Sensation Comment: Denies tingling/numbness    Strength:  Strength Comments: BUEs=~4/5    Hand Function:  Hand Function  Gross Grasp:  Functional  Coordination: Functional    Extremities: RUE   RUE : Within Functional Limits and LUE   LUE: Within Functional Limits    Outcome Measures: [facility] Daily Activity  Putting on and taking off regular lower body clothing: Total  Bathing (including washing, rinsing, drying): A lot  Putting on and taking off regular upper body clothing: A little  Toileting, which includes using toilet, bedpan or urinal: Total  Taking care of personal grooming such as brushing teeth: A little  Eating Meals: A little  Daily Activity - Total Score: 13    Education Documentation  ADL Training, taught by Gayla Motley OT at 7/8/2025  2:42 PM.  Learner: Patient  Readiness: Acceptance  Method: Explanation  Response: Demonstrated Understanding, Needs Reinforcement  Comment: Functional transfer/mobility retraining initiated    Goals:   Encounter Problems       Encounter Problems (Active)       OT Goals       ADLs (Progressing)       Start:  07/08/25    Expected End:  07/22/25       Patient will complete ADL tasks, with set-up, supervision using AE need in order to increase patient's safety and independence with self-care tasks.           Functional transfers (Progressing)       Start:  07/08/25    Expected End:  07/22/25       Patient will complete functional transfers with modified independent using least restrictive device, in order to increase patient's safety and independence with daily tasks.           Activity tolerance (Progressing)       Start:  07/08/25    Expected End:  07/22/25       Patient will demonstrate the ability to participate in functional activity at least >/= 25 minutes in order to increase patient's safety and independence with daily tasks.

## 2025-07-08 NOTE — CONSULTS
Inpatient consult to Cardiology  Consult performed by: LAYA Peres-CNP  Consult ordered by: Bibiana Palma MD  Reason for consult: Peripheral edema        History Of Present Illness:    Rick Cabral is a 85 y.o. male presenting with peripheral edema which has worsened over the past 2 weeks.  Patient recalls no cardiologist.  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  When questioning the patient he reports no shortness of breath despite previous admitting physician noting the patient did respond yes to shortness of breath.  He reports no chest pain.  In the emergency department EKG is sinus rhythm with PVCs with evidence of septal infarct,   Significant artifact is noted limiting this EKG interpretation.  High-sensitivity troponin values of 60 and 59.  proBNP 1241.  Chest x-ray with evidence of congestive failure.  Creatinine 1.02 with a hemoglobin of 12.8.  Presented mildly hypertensive which improved with initial treatment and most recent of 126/92.  In the emergency department patient received IV furosemide and was admitted on telemetry for further testing and treatment.  Note the patient does appear to have some forgetfulness but attempted to answer all my questions appropriately.     Last Recorded Vitals:  Vitals:    07/08/25 0200 07/08/25 0400 07/08/25 0500 07/08/25 0600   BP: (!) 137/93 (!) 143/93 (!) 146/91 (!) 126/92   Pulse: 92 91 94 92   Resp: 19 18 (!) 22 15   Temp:       TempSrc:       SpO2: 95% 97% 98% 96%   Weight:       Height:           Last Labs:  CBC - 7/7/2025:  8:54 PM  11.1 12.8 345    41.2      CMP - 7/7/2025:  8:54 PM  9.1 7.4 18 --- 0.7   _ 3.7 21 48      PTT - No results in last year.  _   _ _     Troponin I, High Sensitivity   Date/Time Value Ref Range Status   07/07/2025 10:57 PM 59 (HH) 0 - 20 ng/L Final     Comment:     Previous result  verified on 7/7/2025 2240 on specimen/case [id number] called with component [facility] for procedure Troponin I, High Sensitivity, Initial with value 60 ng/L.   07/07/2025 08:54 PM 60 (HH) 0 - 20 ng/L Final     BNP   Date/Time Value Ref Range Status   07/07/2025 08:54 PM 1,241 (H) 0 - 99 pg/mL Final     Hemoglobin A1C   Date/Time Value Ref Range Status   02/27/2022 02:19 PM 5.9 (A) % Final     Comment:          Diagnosis of Diabetes-Adults   Non-Diabetic: < or = 5.6%   Increased risk for developing diabetes: 5.7-6.4%   Diagnostic of diabetes: > or = 6.5%  .       Monitoring of Diabetes                Age (y)     Therapeutic Goal (%)   Adults:          >18           <7.0   Pediatrics:    13-18           <7.5                   7-12           <8.0                   0- 6            7.5-8.5   American Diabetes Association. Diabetes Care 33(S1), Jan 2010.       LDL Calculated   Date/Time Value Ref Range Status   07/08/2025 03:34 AM 66 <=99 mg/dL Final     Comment:                                 Near   Borderline      AGE      Desirable  Optimal    High     High     Very High     0-19 Y     0 - 109     ---    110-129   >/= 130     ----    20-24 Y     0 - 119     ---    120-159   >/= 160     ----      >24 Y     0 -  99   100-129  130-159   160-189     >/=190    LDL Cholesterol is calculated using the Friedewald equation.     VLDL   Date/Time Value Ref Range Status   07/08/2025 03:34 AM 12 0 - 40 mg/dL Final   02/27/2022 02:19 PM 19 0 - 40 mg/dL Final      Results for orders placed or performed during the hospital encounter of 07/07/25 (from the past 24 hours)   CBC and Auto Differential   Result Value Ref Range    WBC 11.1 4.4 - 11.3 x10*3/uL    nRBC 0.0 0.0 - 0.0 /100 WBCs    RBC 4.80 4.50 - 5.90 x10*6/uL    Hemoglobin 12.8 (L) 13.5 - 17.5 g/dL    Hematocrit 41.2 41.0 - 52.0 %    MCV 86 80 - 100 fL    MCH 26.7 26.0 - 34.0 pg    MCHC 31.1 (L) 32.0 - 36.0 g/dL    RDW 15.0 (H) 11.5 - 14.5 %    Platelets 345 150 - 450  x10*3/uL    Neutrophils % 72.6 40.0 - 80.0 %    Immature Granulocytes %, Automated 0.4 0.0 - 0.9 %    Lymphocytes % 14.2 13.0 - 44.0 %    Monocytes % 11.1 2.0 - 10.0 %    Eosinophils % 1.1 0.0 - 6.0 %    Basophils % 0.6 0.0 - 2.0 %    Neutrophils Absolute 8.09 (H) 1.60 - 5.50 x10*3/uL    Immature Granulocytes Absolute, Automated 0.04 0.00 - 0.50 x10*3/uL    Lymphocytes Absolute 1.58 0.80 - 3.00 x10*3/uL    Monocytes Absolute 1.23 (H) 0.05 - 0.80 x10*3/uL    Eosinophils Absolute 0.12 0.00 - 0.40 x10*3/uL    Basophils Absolute 0.07 0.00 - 0.10 x10*3/uL   Comprehensive metabolic panel   Result Value Ref Range    Glucose 100 (H) 74 - 99 mg/dL    Sodium 139 136 - 145 mmol/L    Potassium 3.9 3.5 - 5.3 mmol/L    Chloride 103 98 - 107 mmol/L    Bicarbonate 27 21 - 32 mmol/L    Anion Gap 13 10 - 20 mmol/L    Urea Nitrogen 19 6 - 23 mg/dL    Creatinine 1.02 0.50 - 1.30 mg/dL    eGFR 72 >60 mL/min/1.73m*2    Calcium 9.1 8.6 - 10.3 mg/dL    Albumin 3.7 3.4 - 5.0 g/dL    Alkaline Phosphatase 48 33 - 136 U/L    Total Protein 7.4 6.4 - 8.2 g/dL    AST 18 9 - 39 U/L    Bilirubin, Total 0.7 0.0 - 1.2 mg/dL    ALT 21 10 - 52 U/L   Magnesium   Result Value Ref Range    Magnesium 1.94 1.60 - 2.40 mg/dL   B-type natriuretic peptide   Result Value Ref Range    BNP 1,241 (H) 0 - 99 pg/mL   Troponin I, High Sensitivity, Initial   Result Value Ref Range    Troponin I, High Sensitivity 60 (HH) 0 - 20 ng/L   Troponin, High Sensitivity, 1 Hour   Result Value Ref Range    Troponin I, High Sensitivity 59 (HH) 0 - 20 ng/L   Lipid panel   Result Value Ref Range    Cholesterol 126 0 - 199 mg/dL    HDL-Cholesterol 48.2 mg/dL    Cholesterol/HDL Ratio 2.6     LDL Calculated 66 <=99 mg/dL    VLDL 12 0 - 40 mg/dL    Triglycerides 60 0 - 149 mg/dL    Non HDL Cholesterol 78 0 - 149 mg/dL     Last I/O:  No intake/output data recorded.    Past Cardiology Tests (Last 3 Years):  EKG: Per my personal interpretation, sinus rhythm with PVCs and significant  artifact limiting interpretation    Echo: 3/22: Ejection fraction 35 to 40% with stage I diastolic dysfunction      Past Medical History:  He has no past medical history on file.    Past Surgical History:  He has a past surgical history that includes MR angio head wo IV contrast (2/28/2022) and MR angio neck wo IV contrast (2/28/2022).      Social History:  He has no history on file for tobacco use, alcohol use, and drug use.    Family History:  Family History[1]     Allergies:  Patient has no known allergies.    Inpatient Medications:  Scheduled Medications[2]  PRN Medications[3]  Continuous Medications[4]  Outpatient Medications:  No current outpatient medications  Review of Systems   Constitutional: Positive for malaise/fatigue.   Cardiovascular:  Positive for leg swelling. Negative for chest pain, dyspnea on exertion, irregular heartbeat, palpitations and syncope.       Physical Exam:  General: alert, oriented x 2-3 with some confusion, no obvious distress.  Breathing comfortably on room air.  HEENT: normal cephalic, atraumatic, no scleral icterus  Neck: No JVD, bruit or thrill, masses or tenderness   Heart: S1/S2, Rate 70, Rhythm regular, no s3 or s4, no murmur, thrill, or heaves at PMI.   Lungs: Clear in upper lobes, diminished in bases, equal expansion and excursion, no wheezes, crackles, rhales or rhonci room air.  No conversational dyspnea appreciated.  No pain with deep inspiration.   Abdomen: bowel sounds x 4, soft, non-tender  Genitourinary: deferred   Extremities +2 bilateral extremity pitting edema.       Assessment/Plan     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  With diastolic dysfunction.  Has peripheral edema and shortness of breath with evidence of fluid limb overload on chest x-ray and an elevated proBNP over 1200.  Certainly agree with congestive failure.  Will consult cardiac rehab and heart failure navigator.  Check echo.   Continue with IV diuretics.  Start low-dose beta-blocker.  Start low-dose ACE inhibitor.  Will wait on SGLT2 inhibitor to ensure the patient's creatinine is stable.  Shortness of breath: As above.  Peripheral edema: As above.  Hypertensive disorder: Mildly hypertensive.  Have initiated beta-blocker and ACE inhibitor.  History of stroke: Reports no acute strokelike symptoms.    Overall impression:    7/8: As above.  Certainly appears to have an exacerbation of systolic and diastolic heart failure.  Have initiated guideline directed therapy with the exception of SGLT2 inhibitor.  Would like toassess response from kidneys prior to initiating a third agent.  Check echo as last echo was approximately 3 years ago.   anticipate a further 48 hours to achieve euvolemic state.  Will follow with you.    Code Status:  No Order    I spent 60 minutes in the professional and overall care of this patient.        Wong Augustine, APRN-CNP       [1] No family history on file.  [2]   Scheduled medications   Medication Dose Route Frequency    furosemide  40 mg intravenous Daily    heparin  5,000 Units subcutaneous BID   [3]   PRN medications   Medication    acetaminophen    alum-mag hydroxide-simeth    melatonin    ondansetron    ondansetron ODT   [4]   Continuous Medications   Medication Dose Last Rate

## 2025-07-08 NOTE — H&P
History Of Present Illness  Rick Cabral is a 85 y.o. male presenting with lower extremity edema.  Patient is a somewhat suboptimal historian.  He has a history of hypertension, CVA in 2022, dyslipidemia, BPH.  There is no history of coronary artery disease or atrial fibrillation.  No history of congestive heart failure.  Patient presented to the emergency department due to lower extremity edema.  He also had some shortness of breath.  Denies chest pain.  Patient had elevation of troponin to 60..  BNP was 1200.  Patient received a dose of IV Lasix in the emergency department.  During exam, he states that he is feeling better.  Past Medical History  Hypertension  CVA  Dyslipidemia  BPH    Surgical History  He has a past surgical history that includes MR angio head wo IV contrast (2/28/2022) and MR angio neck wo IV contrast (2/28/2022).     Social History  He has no history on file for tobacco use, alcohol use, and drug use.    Family History  Patient cannot provide during exam     Allergies  Patient has no known allergies.    Review of Systems   Constitutional: Negative.    HENT: Negative.     Eyes: Negative.    Respiratory:  Positive for shortness of breath.    Cardiovascular:  Positive for leg swelling.   Gastrointestinal: Negative.    Endocrine: Negative.    Genitourinary: Negative.    Musculoskeletal: Negative.    Skin: Negative.    Allergic/Immunologic: Negative.    Neurological: Negative.    Hematological: Negative.    Psychiatric/Behavioral: Negative.          Physical Exam  Location: Emergency department, room 12.  Pi is in no apparent distress.   Cooperative with exam.  Nontoxic-appearing.  Comfortable at rest on room air.  Skin is clean, dry.  No skin lesions, rashes, ecchymosis.  Head is atraumatic, normocephalic.  Mouth mucosa is pink and moist.  No mucosal lesions.  No nasal discharge.  Musculoskeletal: No deformities, no muscle swelling.  No point tenderness to palpation.  Neck is supple, no JVD, no  carotid bruits.  No lymphadenopathy.  Chest is atraumatic.  No tenderness to palpation.  Lungs are clear to auscultation bilaterally.  Diminished at bases bilaterally no wheezes, no rales, no rhonchi.  Heart: Regular rate and rhythm S1-S2.  No murmurs, rubs, gallops.  Peripheral pulses are palpable in all extremities, equal.  Abdomen is soft, not tender, not distended.  Bowel sounds positive in all quadrants.  No rebound, no guarding.  Rectal exam deferred.  Extremities: +2 bilateral peripheral edema, cords, cyanosis, varices.  Neuro: Patient is alert, oriented to name and place.  Hard of hearing. moves all extremities.  No gross focal neurological deficits.  Face is symmetrical.  Tongue is midline.  No visual abnormalities.  No dysarthria or aphasia.  Psychiatric: Patient is cooperative with exam, maintains good eye contact.  No evidence of psychosis, suicidal ideation or depression.   Last Recorded Vitals  BP (!) 137/93   Pulse 92   Temp 36.2 °C (97.2 °F) (Temporal)   Resp 19   Wt 85.3 kg (188 lb)   SpO2 95%     Relevant Results        XR chest 1 view  Result Date: 7/7/2025  Interpreted By:  Mckenzie Reyes, STUDY: XR CHEST 1 VIEW;  7/7/2025 8:01 pm   INDICATION: Signs/Symptoms:SOB.   COMPARISON: Chest x-ray 02/27/2022   ACCESSION NUMBER(S): [id number]   ORDERING CLINICIAN: RADHA PORTILLO   FINDINGS:     CARDIOMEDIASTINAL SILHOUETTE: Cardiac silhouette is enlarged. Mild pulmonary vascular congestion. Atherosclerotic calcification of the aorta.   LUNGS: Bibasilar opacities with mild blunting of the costophrenic angles may relate to small layering effusions and atelectasis. Superimposed infection not excluded. No pneumothorax   ABDOMEN: No remarkable upper abdominal findings.   BONES: Multilevel degenerative changes of the spine. Bilateral shoulder osteoarthrosis.       Cardiomegaly with mild pulmonary vascular congestion. Bibasilar opacities likely relate to small layering effusions and atelectasis.  Superimposed infection not excluded. Clinical correlation and attention on continued follow-up is advised.   MACRO: None   Signed by: Mckenzie Reyes 7/7/2025 8:13 PM Dictation workstation:   [id number]       Results for orders placed or performed during the hospital encounter of 07/07/25 (from the past 24 hours)   CBC and Auto Differential   Result Value Ref Range    WBC 11.1 4.4 - 11.3 x10*3/uL    nRBC 0.0 0.0 - 0.0 /100 WBCs    RBC 4.80 4.50 - 5.90 x10*6/uL    Hemoglobin 12.8 (L) 13.5 - 17.5 g/dL    Hematocrit 41.2 41.0 - 52.0 %    MCV 86 80 - 100 fL    MCH 26.7 26.0 - 34.0 pg    MCHC 31.1 (L) 32.0 - 36.0 g/dL    RDW 15.0 (H) 11.5 - 14.5 %    Platelets 345 150 - 450 x10*3/uL    Neutrophils % 72.6 40.0 - 80.0 %    Immature Granulocytes %, Automated 0.4 0.0 - 0.9 %    Lymphocytes % 14.2 13.0 - 44.0 %    Monocytes % 11.1 2.0 - 10.0 %    Eosinophils % 1.1 0.0 - 6.0 %    Basophils % 0.6 0.0 - 2.0 %    Neutrophils Absolute 8.09 (H) 1.60 - 5.50 x10*3/uL    Immature Granulocytes Absolute, Automated 0.04 0.00 - 0.50 x10*3/uL    Lymphocytes Absolute 1.58 0.80 - 3.00 x10*3/uL    Monocytes Absolute 1.23 (H) 0.05 - 0.80 x10*3/uL    Eosinophils Absolute 0.12 0.00 - 0.40 x10*3/uL    Basophils Absolute 0.07 0.00 - 0.10 x10*3/uL   Comprehensive metabolic panel   Result Value Ref Range    Glucose 100 (H) 74 - 99 mg/dL    Sodium 139 136 - 145 mmol/L    Potassium 3.9 3.5 - 5.3 mmol/L    Chloride 103 98 - 107 mmol/L    Bicarbonate 27 21 - 32 mmol/L    Anion Gap 13 10 - 20 mmol/L    Urea Nitrogen 19 6 - 23 mg/dL    Creatinine 1.02 0.50 - 1.30 mg/dL    eGFR 72 >60 mL/min/1.73m*2    Calcium 9.1 8.6 - 10.3 mg/dL    Albumin 3.7 3.4 - 5.0 g/dL    Alkaline Phosphatase 48 33 - 136 U/L    Total Protein 7.4 6.4 - 8.2 g/dL    AST 18 9 - 39 U/L    Bilirubin, Total 0.7 0.0 - 1.2 mg/dL    ALT 21 10 - 52 U/L   Magnesium   Result Value Ref Range    Magnesium 1.94 1.60 - 2.40 mg/dL   B-type natriuretic peptide   Result Value Ref Range    BNP 1,241  (H) 0 - 99 pg/mL   Troponin I, High Sensitivity, Initial   Result Value Ref Range    Troponin I, High Sensitivity 60 (HH) 0 - 20 ng/L   Troponin, High Sensitivity, 1 Hour   Result Value Ref Range    Troponin I, High Sensitivity 59 (HH) 0 - 20 ng/L         Assessment/Plan   Assessment & Plan  Acute on chronic congestive heart failure, unspecified heart failure type      CHF, acute, new onset.  IV Lasix.  Check echocardiogram.  Fluid restriction.  Cardiology consult.  Check TSH.  Check lipid profile.  Hypertension.  Patient does not know his home medications.  [facility].  DVT prophylaxis.  Heparin subcu  PT OT consult       Bibiana Palma MD

## 2025-07-08 NOTE — CARE PLAN
Problem: Heart Failure  Goal: Improved gas exchange this shift  Outcome: Progressing     Problem: Pain - Adult  Goal: Verbalizes/displays adequate comfort level or baseline comfort level  Outcome: Progressing     Problem: Safety - Adult  Goal: Free from fall injury  Outcome: Progressing

## 2025-07-08 NOTE — PROGRESS NOTES
Rick Cabral is a 85 y.o. male on day 0 of admission presenting with Acute on chronic congestive heart failure, unspecified heart failure type.      Subjective   Patient denied chest pain.  He had swelling both lower extremities.         Objective     Last Recorded Vitals  BP (!) 148/106 (BP Location: Right arm, Patient Position: Lying)   Pulse 102   Temp 36.5 °C (97.7 °F) (Oral)   Resp 16   Wt 85.3 kg (188 lb)   SpO2 97%   Intake/Output last 3 Shifts:    Intake/Output Summary (Last 24 hours) at 7/8/2025 1151  Last data filed at 7/8/2025 1000  Gross per 24 hour   Intake 100 ml   Output --   Net 100 ml       Admission Weight  Weight: 85.3 kg (188 lb) (07/07/25 1932)    Daily Weight  07/07/25 : 85.3 kg (188 lb)    Image Results  ECG 12 lead  Sinus rhythm with frequent Premature ventricular complexes and Fusion complexes  Left axis deviation  Incomplete right bundle branch block  Septal infarct , age undetermined  Abnormal ECG  When compared with ECG of 27-FEB-2022 14:13,  Fusion complexes are now Present  Septal infarct is now Present  T wave inversion more evident in Lateral leads      Physical Exam    General:  cooperating during physical exam.  HEENT: Pupils are equal and reactive to light and commendation , oral mucosa moist, no JVD.  Slight facial asymmetry from previous stroke  Cardiovascular:  PMI nondisplaced  Lungs: Clear to auscultation bilaterally, no wheezing, no crackles, no dullness to percussion.  Abdomen: No hepatosplenomegaly appreciated, soft , not tender, positive bowel sounds, positive bowel movement.  Neuro: Alert and oriented x2, strength in upper and lower extremities , sensation intact.  Psych: Patient had great insight was going on  Musculoskeletal: +1 pedal edema both lower extremities   vascular: Pulses are intact in upper and lower extremities  Skin: No petechiae, ecchymosis or other stigmata for dermatology disease.     Assessment and plan    Acute on chronic congestive heart failure  with systolic dysfunction  Continue with IV Lasix   Patient is on lisinopril and Coreg.  Cardiology on case.  Plan for 2D echo today.    Shortness of breath  Secondary to acute congestive heart failure    History of stroke  Patient had stroke in 2022    Hypertension  Continue with current medication.    DVT prophylax.    Paulette López MD

## 2025-07-08 NOTE — PROGRESS NOTES
Pharmacy Medication History Review    Rick Cabral is a 85 y.o. male admitted for Acute on chronic congestive heart failure, unspecified heart failure type. Pharmacy reviewed the patient's [id number] medications and allergies for accuracy.    Medications ADDED:  Amlodipine 5mg   Tamsulosin 0.4mg  Finasteride 5mg  Vitamin D3 2000unit  Clopidogrel 75mg  Aspirin 81mg  Rosuvastatin 5mg  Debrox  Medications CHANGED:  none  Medications REMOVED:   none     The list below reflects the updated PTA list. Comments regarding how patient may be taking medications differently can be found in the Admit Orders Activity  Prior to Admission Medications   Prescriptions Last Dose Informant   amLODIPine (Norvasc) 5 mg tablet Unknown Family Member   Sig: Take 1 tablet (5 mg) by mouth once daily.   aspirin 81 mg EC tablet Unknown Family Member   Sig: Take 1 tablet (81 mg) by mouth once daily.   carbamide peroxide (Debrox) 6.5 % otic solution Unknown Family Member   Si-10 drops 2 times a day.   cholecalciferol (Vitamin D-3) 50 mcg (2,000 units) tablet Unknown Family Member   Sig: Take 1 tablet (50 mcg) by mouth once daily.   clopidogrel (Plavix) 75 mg tablet Unknown Family Member   Sig: Take 1 tablet (75 mg) by mouth once daily.   finasteride (Proscar) 5 mg tablet Unknown Family Member   Sig: Take 1 tablet (5 mg) by mouth once daily. Do not crush, chew, or split.   rosuvastatin (Crestor) 5 mg tablet Unknown Family Member   Sig: Take 1 tablet (5 mg) by mouth once daily.   tamsulosin (Flomax) 0.4 mg 24 hr capsule Unknown Family Member   Sig: Take 1 capsule (0.4 mg) by mouth once daily. Do not crush, chew, or split.      Facility-Administered Medications: None        The list below reflects the updated allergy list. Please review each documented allergy for additional clarification and justification.  Allergies  Reviewed by Diane Hayden CPhT on 2025   No Known Allergies         Pharmacy has been updated to  none.    Sources used to complete the med history include [facility], hand typed medication list from family left over night.    Below are additional concerns with the patient's PTA list.  [facility] list shows Ezetimibe 10mg as active, patient list shows this as stopped. Please confirm with family. I did not add to the list per the typed list from family.    Diane Hayden, CPhT-Adv  Please reach out via [employer] Secure Chat for questions

## 2025-07-08 NOTE — ED PROVIDER NOTES
HPI   Chief Complaint   Patient presents with    Shortness of Breath    Leg Swelling     Pt to ED via EMS for SOB that started this morning, bilateral leg swelling for past few days and increased weakness and falls for the past few weeks. Pt lives at home with family. Pt was 95% RA. No headache, ear/sinus/throat/chest/abdominal pain, cough, nausea, vomiting, diarrhea, fever, chills, body aches. Some painful urination. Pt with some discharge in right eye that is baseline due to history of operation. Pt said he fell out of bed a few times lately but did not hit head; making jokes in triage.       Patient is 85-year-old male with a history of BPH, CVA, nicotine dependence, hypercholesterolemia, hyperlipidemia, hypertension presenting to the emergency department for evaluation of bilateral lower extremity swelling.  Patient states his wife made him come due to increased swelling in his bilateral lower extremities.  Patient states he otherwise feels okay.  Denies any chest pain, shortness of breath, fevers, chills, nausea, vomiting, abdominal pain.  He denies any recent travel or sick contacts.  He denies any pain in the legs.  He is unsure if he is on any water pills.              Patient History   Medical History[1]  Surgical History[2]  Family History[3]  Social History[4]    Physical Exam   ED Triage Vitals [07/07/25 1932]   Temperature Heart Rate Respirations BP   36.2 °C (97.2 °F) 100 20 141/86      Pulse Ox Temp Source Heart Rate Source Patient Position   96 % Temporal -- Sitting      BP Location FiO2 (%)     Right arm --       Physical Exam  Vitals and nursing note reviewed.   Constitutional:       General: He is not in acute distress.     Appearance: He is well-developed. He is not ill-appearing or toxic-appearing.   HENT:      Head: Normocephalic and atraumatic.      Mouth/Throat:      Mouth: Mucous membranes are moist.   Eyes:      Pupils: Pupils are equal, round, and reactive to light.   Cardiovascular:       Rate and Rhythm: Normal rate and regular rhythm.   Pulmonary:      Effort: Pulmonary effort is normal.      Breath sounds: Decreased breath sounds present. No wheezing, rhonchi or rales.   Abdominal:      Palpations: Abdomen is soft.      Tenderness: There is no abdominal tenderness.   Musculoskeletal:         General: Normal range of motion.      Cervical back: Normal range of motion.      Right lower leg: Edema present.      Left lower leg: Edema present.   Skin:     General: Skin is warm and dry.   Neurological:      General: No focal deficit present.      Mental Status: He is alert and oriented to person, place, and time.   Psychiatric:         Mood and Affect: Mood normal.         Behavior: Behavior normal.           ED Course & MDM   Diagnoses as of 07/07/25 2305   Acute on chronic congestive heart failure, unspecified heart failure type                 No data recorded     [place] Coma Scale Score: 15 (07/07/25 1933 : Myrtle Bucio RN)                           Medical Decision Making  **Disclaimer parts of this chart have been completed using voice recognition software. Please excuse any errors of transcription.     Patient seen in conjunction with attending physician Dr. Brown.    HPI: Detailed above.    Exam: A medically appropriate exam performed, outlined above, given the known history and presentation.    History obtained from: Patient    EKG: Reviewed by myself.  Reviewed and interpreted by my attending physician.    Labs/Diagnostics:  Labs Reviewed   CBC WITH AUTO DIFFERENTIAL - Abnormal       Result Value    WBC 11.1      nRBC 0.0      RBC 4.80      Hemoglobin 12.8 (*)     Hematocrit 41.2      MCV 86      MCH 26.7      MCHC 31.1 (*)     RDW 15.0 (*)     Platelets 345      Neutrophils % 72.6      Immature Granulocytes %, Automated 0.4      Lymphocytes % 14.2      Monocytes % 11.1      Eosinophils % 1.1      Basophils % 0.6      Neutrophils Absolute 8.09 (*)     Immature Granulocytes Absolute,  Automated 0.04      Lymphocytes Absolute 1.58      Monocytes Absolute 1.23 (*)     Eosinophils Absolute 0.12      Basophils Absolute 0.07     COMPREHENSIVE METABOLIC PANEL - Abnormal    Glucose 100 (*)     Sodium 139      Potassium 3.9      Chloride 103      Bicarbonate 27      Anion Gap 13      Urea Nitrogen 19      Creatinine 1.02      eGFR 72      Calcium 9.1      Albumin 3.7      Alkaline Phosphatase 48      Total Protein 7.4      AST 18      Bilirubin, Total 0.7      ALT 21     B-TYPE NATRIURETIC PEPTIDE - Abnormal    BNP 1,241 (*)     Narrative:        <100 pg/mL - Heart failure unlikely  100-299 pg/mL - Intermediate probability of acute heart                  failure exacerbation. Correlate with clinical                  context and patient history.    >=300 pg/mL - Heart Failure likely. Correlate with clinical                  context and patient history.    BNP testing is performed using different testing methodology at [facility] than at other [facility] hospitals. Direct result comparisons should only be made within the same method.      SERIAL TROPONIN-INITIAL - Abnormal    Troponin I, High Sensitivity 60 (*)     Narrative:     Less than 99th percentile of normal range cutoff-  Female and children under 18 years old <14 ng/L; Male <21 ng/L: Negative  Repeat testing should be performed if clinically indicated.     Female and children under 18 years old 14-50 ng/L; Male 21-50 ng/L:  Consistent with possible cardiac damage and possible increased clinical   risk. Serial measurements may help to assess extent of myocardial damage.     >50 ng/L: Consistent with cardiac damage, increased clinical risk and  myocardial infarction. Serial measurements may help assess extent of   myocardial damage.      NOTE: Children less than 1 year old may have higher baseline troponin   levels and results should be interpreted in conjunction with the overall   clinical context.     NOTE: Troponin I testing is  performed using a different   testing methodology at [facility] than at other   [facility]. Direct result comparisons should only   be made within the same method.   MAGNESIUM - Normal    Magnesium 1.94     TROPONIN SERIES- (INITIAL, 1 HR)    Narrative:     The following orders were created for panel order Troponin I Series, High Sensitivity (0, 1 HR).  Procedure                               Abnormality         Status                     ---------                               -----------         ------                     Troponin I, High Sensiti...[[id number]]  Abnormal            Final result               Troponin, High Sensitivi...[[id number]]                      In process                   Please view results for these tests on the individual orders.   SERIAL TROPONIN, 1 HOUR     XR chest 1 view   Final Result   Cardiomegaly with mild pulmonary vascular congestion. Bibasilar   opacities likely relate to small layering effusions and atelectasis.   Superimposed infection not excluded. Clinical correlation and   attention on continued follow-up is advised.        MACRO:   None        Signed by: Mckenzie Reyes 7/7/2025 8:13 PM   Dictation workstation:   [id number]        EMERGENCY DEPARTMENT COURSE and DIFFERENTIAL DIAGNOSIS/MDM:  Patient is 85-year-old male presenting to the emergency department for evaluation of bilateral lower extremity swelling.  On physical exam vital signs remarkable for hypertension but otherwise stable and patient is in no acute distress.  Patient has bilateral lower extremity pitting edema with no active complaints.  He has some decreased breath sounds bilaterally on auscultation of the lungs however satting at 96% on room air.  No increased work of breathing.  Diagnostic labs ordered as well as chest x-ray.  Chest x-ray showed cardiomegaly with mild pulmonary vascular congestion and bibasilar opacities likely related to small layering effusions and atelectasis with  superimposed infection not excluded.  BNP elevated at 1241 therefore 40 mg of IV Lasix given.  CMP showed no electrolyte abnormalities.  Magnesium normal.  CBC showed mild anemia but no leukocytosis.  Initial troponin 60 and repeat troponin pending at the time my departure.  Continuation of care as well as final disposition will be determined by attending physician .     The patient presented with a chief complaint of bilateral lower extremity swelling. The differential diagnosis associated with this patient's presentation includes pleural effusions, cardiomegaly, CHF exacerbation, electrolyte abnormalities, ACS.     Vitals:    Vitals:    07/07/25 1932   BP: 141/86   BP Location: Right arm   Patient Position: Sitting   Pulse: 100   Resp: 20   Temp: 36.2 °C (97.2 °F)   TempSrc: Temporal   SpO2: 96%   Weight: 85.3 kg (188 lb)   Height: 1.829 m (6')     History Limited by:    None    Independent history obtained from:    None    External records reviewed:    Outpatient Note primary care physician note from June 2025    Diagnostics interpreted by me:    Xrays - see my independent interpretation in MDM    Discussions with other clinicians:    None    Chronic conditions impacting care:    Hypertension    Social determinants of health affecting care:    None    Diagnostic tests considered but not performed: None    ED Medications managed:    Medications   furosemide (Lasix) injection 40 mg (has no administration in time range)       Prescription drugs considered:    None    Screenings:              Procedure  Procedures         [1] No past medical history on file.  [2]   Past Surgical History:  Procedure Laterality Date    MR HEAD ANGIO WO IV CONTRAST  2/28/2022    MR HEAD ANGIO WO IV CONTRAST 2/28/2022 [facility] CLINICAL LEGACY    MR NECK ANGIO WO IV CONTRAST  2/28/2022    MR NECK ANGIO WO IV CONTRAST 2/28/2022 [facility] CLINICAL LEGACY   [3] No family history on file.  [4]   Social History  Tobacco Use    Smoking status:  Not on file    Smokeless tobacco: Not on file   Substance Use Topics    Alcohol use: Not on file    Drug use: Not on file        Mariama Clemente PA-C  07/07/25 [id number]

## 2025-07-08 NOTE — CONSULTS
Heart Failure Nurse Navigator    The role of the HF nurse navigator is to (1) characterize risk profiles of patients with heart failure transitioning from [id number] after hospitalization, (2) recommend interventions to improve care and reduce risks of worse post-hospitalization outcomes. Potential recommendations may touch base on patient/family education, additional consults that may bring value, and appointment scheduling.    History    I met with Rick Misha at the bedside, and my impressions include:  85 y.o. male presenting with peripheral edema which has worsened over the past 2 weeks.  Patient recalls no cardiologist.  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   BNP 1241, TTE pending at time of this note.    Patients Cardiologist(s): None    [facility]    1. Medical Domain  What is the patient's most recent LVEF? 20-25  HFrEF (LVEF <= 40%) Quadruple therapy recommended  HFmrEF (LVEF 41-49%) Quadruple therapy recommended  HFpEF (LVEF >= 50%) Minimum recommendations: SGLT2i and MRA  Is the patient on OP GDMT for their condition?   ARNI/ACEI/ARB: No None  BB: No None  MRA: No None  SGLT2i: No None  Is the patient prescribed a diuretic? No  None  Does this patient have an implanted device (ie cardioMEMS, ICD, CRT-D)?  Device type: None  Could this patient have advanced heart failure (Stage D heart failure)?: Yes   I  2. Mind and Emotion  Does this patient have possible cognitive impairment?: Yes Family provides care.  Does this patient have major depression?: No   3. Physical Function  Could this patient be frail?: Yes   Defined by presence of all of these: slowness, weakness, shrinking, inactivity, exhaustion  Is this patient at risk for falling?: Yes   Defined by having experienced a fall in the last 12 months.      I provided the following heart  failure education:  - Living With Heart Failure book provided.  - HF signs and symptoms, heart failure zones and when to call cardiologist.   - Controlling Heart Failure at Home: medication adherence, following up with cardiologist at least once yearly, staying healthy and active, limiting sodium and fluid intake as directed by cardiologist.  - Daily Weight Education    Assessment  Met with pt in room. Pt states he has had multiple strokes. He lives with his son, Giovanna who provides him with food and his meds. He does not know what meds he takes at home. He gave me permission to reach out to his family for additional info. I did leave HF education folder in his room.  I called out to his only contact, Dora/hebert. She states he lives with giovanna however, she, Giovanna and his wife Katharina all provide care. [facility] only [facility] and Dora states he does not have any heart issues that she knows of other than [facility] the [facility] but the [facility] had never gotten [facility] to them on what/why that was.    DC  Medications:     Current GDMT: If NOT, please note reason in 'wildcard' at bottom of each list  ARNI/ACEI/ARB: YesLisinopril 2.5 mg daily  BB: Yes Carvedilol 3.125 mg BID  MRA: No None  SGLT2i: Yes Farxiga 5 mg daily  Is the patient prescribed a diuretic? Yes  Furosemide 20 mg daily    Pt is also going to be DC home with [employer]. Family declined [facility].    Recommendations/ Plan  Follow up with cards as recommended, take all meds, keep salt and salty foods to a minimum, practice daily wts and monitor for HF symptoms and report them promptly to you cardiologist.      *Cardiology Discharge Appointment Follow-up Plan: Daughter will make [facility] follow up appt with Cards if needed.    Augusta Bynum RN BSN  [facility] Clinical Nurse Navigator, CHF  [phone]

## 2025-07-09 LAB
ANION GAP SERPL CALCULATED.3IONS-SCNC: 12 MMOL/L (ref 10–20)
AORTIC VALVE MEAN GRADIENT: 2 MMHG
AORTIC VALVE PEAK VELOCITY: 1.06 M/S
AV PEAK GRADIENT: 4 MMHG
AVA (PEAK VEL): 1.81 CM2
AVA (VTI): 1.61 CM2
BUN SERPL-MCNC: 26 MG/DL (ref 6–23)
CALCIUM SERPL-MCNC: 8.5 MG/DL (ref 8.6–10.3)
CHLORIDE SERPL-SCNC: 104 MMOL/L (ref 98–107)
CO2 SERPL-SCNC: 29 MMOL/L (ref 21–32)
CREAT SERPL-MCNC: 1.19 MG/DL (ref 0.5–1.3)
EGFRCR SERPLBLD CKD-EPI 2021: 60 ML/MIN/1.73M*2
EJECTION FRACTION APICAL 4 CHAMBER: 28.3
EJECTION FRACTION: 23 %
GLUCOSE SERPL-MCNC: 89 MG/DL (ref 74–99)
LEFT ATRIUM VOLUME AREA LENGTH INDEX BSA: 43.6 ML/M2
LEFT VENTRICLE INTERNAL DIMENSION DIASTOLE: 5.26 CM (ref 3.5–6)
LEFT VENTRICULAR OUTFLOW TRACT DIAMETER: 2 CM
LV EJECTION FRACTION BIPLANE: 23 %
MITRAL VALVE E/A RATIO: 1.27
POTASSIUM SERPL-SCNC: 3.7 MMOL/L (ref 3.5–5.3)
RIGHT VENTRICLE PEAK SYSTOLIC PRESSURE: 37 MMHG
SODIUM SERPL-SCNC: 141 MMOL/L (ref 136–145)
TRICUSPID ANNULAR PLANE SYSTOLIC EXCURSION: 2.1 CM

## 2025-07-09 PROCEDURE — 1200000002 HC GENERAL ROOM WITH TELEMETRY DAILY

## 2025-07-09 PROCEDURE — 97530 THERAPEUTIC ACTIVITIES: CPT | Mod: GO,CO

## 2025-07-09 PROCEDURE — 99232 SBSQ HOSP IP/OBS MODERATE 35: CPT | Performed by: INTERNAL MEDICINE

## 2025-07-09 PROCEDURE — 97110 THERAPEUTIC EXERCISES: CPT | Mod: GP,CQ

## 2025-07-09 PROCEDURE — 97535 SELF CARE MNGMENT TRAINING: CPT | Mod: GO,CO

## 2025-07-09 PROCEDURE — 36415 COLL VENOUS BLD VENIPUNCTURE: CPT | Performed by: INTERNAL MEDICINE

## 2025-07-09 PROCEDURE — 2500000001 HC RX 250 WO HCPCS SELF ADMINISTERED DRUGS (ALT 637 FOR MEDICARE OP): Performed by: NURSE PRACTITIONER

## 2025-07-09 PROCEDURE — 2500000004 HC RX 250 GENERAL PHARMACY W/ HCPCS (ALT 636 FOR OP/ED): Performed by: INTERNAL MEDICINE

## 2025-07-09 PROCEDURE — 80048 BASIC METABOLIC PNL TOTAL CA: CPT | Performed by: INTERNAL MEDICINE

## 2025-07-09 RX ORDER — FUROSEMIDE 20 MG/1
20 TABLET ORAL DAILY
Status: DISCONTINUED | OUTPATIENT
Start: 2025-07-09 | End: 2025-07-11 | Stop reason: HOSPADM

## 2025-07-09 RX ORDER — DAPAGLIFLOZIN 10 MG/1
5 TABLET, FILM COATED ORAL DAILY
Status: DISCONTINUED | OUTPATIENT
Start: 2025-07-09 | End: 2025-07-11 | Stop reason: HOSPADM

## 2025-07-09 RX ADMIN — LISINOPRIL 2.5 MG: 2.5 TABLET ORAL at 09:08

## 2025-07-09 RX ADMIN — CARVEDILOL 3.12 MG: 3.12 TABLET, FILM COATED ORAL at 17:25

## 2025-07-09 RX ADMIN — HEPARIN SODIUM 5000 UNITS: 5000 INJECTION, SOLUTION INTRAVENOUS; SUBCUTANEOUS at 09:08

## 2025-07-09 RX ADMIN — FUROSEMIDE 40 MG: 10 INJECTION, SOLUTION INTRAMUSCULAR; INTRAVENOUS at 09:08

## 2025-07-09 RX ADMIN — DAPAGLIFLOZIN 5 MG: 10 TABLET, FILM COATED ORAL at 09:21

## 2025-07-09 RX ADMIN — CARVEDILOL 3.12 MG: 3.12 TABLET, FILM COATED ORAL at 09:08

## 2025-07-09 RX ADMIN — FUROSEMIDE 20 MG: 20 TABLET ORAL at 09:27

## 2025-07-09 RX ADMIN — HEPARIN SODIUM 5000 UNITS: 5000 INJECTION, SOLUTION INTRAVENOUS; SUBCUTANEOUS at 21:35

## 2025-07-09 ASSESSMENT — COGNITIVE AND FUNCTIONAL STATUS - GENERAL
DRESSING REGULAR LOWER BODY CLOTHING: A LOT
STANDING UP FROM CHAIR USING ARMS: A LITTLE
STANDING UP FROM CHAIR USING ARMS: A LOT
WALKING IN HOSPITAL ROOM: A LOT
MOVING TO AND FROM BED TO CHAIR: A LOT
DRESSING REGULAR UPPER BODY CLOTHING: A LOT
HELP NEEDED FOR BATHING: A LOT
MOVING FROM LYING ON BACK TO SITTING ON SIDE OF FLAT BED WITH BEDRAILS: A LITTLE
EATING MEALS: A LITTLE
DAILY ACTIVITIY SCORE: 13
DRESSING REGULAR UPPER BODY CLOTHING: A LITTLE
TURNING FROM BACK TO SIDE WHILE IN FLAT BAD: A LOT
EATING MEALS: A LITTLE
TOILETING: A LOT
MOVING FROM LYING ON BACK TO SITTING ON SIDE OF FLAT BED WITH BEDRAILS: A LITTLE
MOBILITY SCORE: 13
CLIMB 3 TO 5 STEPS WITH RAILING: A LOT
PERSONAL GROOMING: A LOT
PERSONAL GROOMING: A LITTLE
TURNING FROM BACK TO SIDE WHILE IN FLAT BAD: A LITTLE
TOILETING: TOTAL
HELP NEEDED FOR BATHING: A LOT
MOVING TO AND FROM BED TO CHAIR: A LOT
CLIMB 3 TO 5 STEPS WITH RAILING: TOTAL
WALKING IN HOSPITAL ROOM: A LOT
MOBILITY SCORE: 14
DRESSING REGULAR LOWER BODY CLOTHING: A LOT
DAILY ACTIVITIY SCORE: 14

## 2025-07-09 ASSESSMENT — PAIN SCALES - GENERAL
PAINLEVEL_OUTOF10: 0 - NO PAIN

## 2025-07-09 ASSESSMENT — PAIN - FUNCTIONAL ASSESSMENT
PAIN_FUNCTIONAL_ASSESSMENT: 0-10

## 2025-07-09 ASSESSMENT — ACTIVITIES OF DAILY LIVING (ADL)
HOME_MANAGEMENT_TIME_ENTRY: 17
BATHING_WHERE_ASSESSED: EDGE OF BED;OTHER (COMMENT)
BATHING_LEVEL_OF_ASSISTANCE: MODERATE ASSISTANCE
LACK_OF_TRANSPORTATION: NO

## 2025-07-09 NOTE — PROGRESS NOTES
"Occupational Therapy    OT Treatment    Patient Name: Rick Cabral  MRN: [id number]  Department: [address]  Room: [address] [place], [place] [address]  Today's Date: 7/9/2025  Time Calculation  Start Time: 1246  Stop Time: 1313  Time Calculation (min): 27 min        Assessment:  OT Assessment: Pt making good progress to dresisng, bathing, transfers to POC.  Pt easily fatigued stating \"I  an very tired want ot sleep\"  Will continue to address remaining deficits with skilled OT intervention.  Prognosis: Good  Barriers to Discharge Home: Caregiver assistance, Cognition needs, Physical needs  Caregiver Assistance: Caregiver assistance needed per identified barriers - however, level of patient's required assistance exceeds assistance available at home  Cognition Needs: Insight of patient limited regarding functional ability/needs  Physical Needs: Stair navigation into home limited by function/safety, Ambulating household distances limited by function/safety, 24hr mobility assistance needed, 24hr ADL assistance needed, High falls risk due to function or environment  Evaluation/Treatment Tolerance: Patient limited by fatigue  Medical Staff Made Aware: Yes  End of Session Communication: Bedside nurse  End of Session Patient Position: Bed, 3 rail up, Alarm on (call light in reach all needs met)  Prognosis: Good  Evaluation/Treatment Tolerance: Patient limited by fatigue  Medical Staff Made Aware: Yes  Strengths: Premorbid level of function  Barriers to Participation: Comorbidities  Plan:  Treatment Interventions: ADL retraining, Functional transfer training, Endurance training, Patient/family training, Equipment evaluation/education, Compensatory technique education  OT Frequency: 4 times per week (during acute care hospitalization)  OT Discharge Recommendations: Moderate intensity level of continued care (Based on current functional status and rehab potential, patient is anticipated to tolerate and benefit from 5 or more days per week of skilled " rehabilitative therapy after discharge from this acute inpatient hospitalization.)  Equipment Recommended upon Discharge: Wheeled walker  OT Recommended Transfer Status: Assist of 1 (for transfers/ADLs, assist of 2 for mobility)  OT - OK to Discharge: Yes  Treatment Interventions: ADL retraining, Functional transfer training, Endurance training, Patient/family training, Equipment evaluation/education, Compensatory technique education    Subjective   OT Visit Info:  OT Received On: 07/09/25  General Visit Info:  General  Reason for Referral: Impaired ADLs d/t acute on chronic CHF  Referred By: Dr Palma  Past Medical History Relevant to Rehab: BPH,CVA, systolic & diastolic HF, HTN, HLD, nicotine dependence  Prior to Session Communication: Bedside nurse  Patient Position Received: Bed, 3 rail up, Alarm off, not on at start of session  Preferred Learning Style: verbal, visual  General Comment: Pt agreeable to skilled OT intervention  Precautions:  Hearing/Visual Limitations: mildly [place]  Medical Precautions: Fall precautions  Precautions Comment: Purewick, telemetry, LUE PIV (hep locked)    Pain:  Pain Assessment  Pain Assessment: 0-10  0-10 (Numeric) Pain Score: 0 - No pain    Objective    Cognition:  Cognition  Orientation Level: Oriented X4  Following Commands: Follows one step commands with increased time  Insight: Mild  Processing Speed: Delayed  Coordination:  Movements are Fluid and Coordinated: Yes  Activities of Daily Living:      Grooming  Grooming Level of Assistance: Close supervision  Grooming Where Assessed: Edge of bed  Grooming Comments: brushing teeth, washing face    UE Bathing  UE Bathing Level of Assistance: Close supervision  UE Bathing Where Assessed: Edge of bed  UE Bathing Comments: sponge bathing cues advance/sequence    LE Bathing  LE Bathing Level of Assistance: Moderate assistance  LE Bathing Where Assessed: Edge of bed, Other (Comment) (stance at bedside RW)  LE Bathing Comments: sponge  bathing assist B feet, buttocks in stance at     UE Dressing  UE Dressing Level of Assistance: Minimum assistance  UE Dressing Where Assessed: Edge of bed  UE Dressing Comments: doff/don hospital gown    LE Dressing  LE Dressing: Yes  LE Dressing Adaptive Equipment: Sock aide  Sock Level of Assistance: Moderate assistance  LE Dressing Where Assessed: Edge of bed  LE Dressing Comments: doff/don socks reach to doff partial don L assist trial use sock aid increased time/effort    Toileting  Toileting Adaptive Equipment: Cathertization equipment (+Purewick)  Toileting Level of Assistance: Maximum assistance  Where Assessed: Bed level  Toileting Comments: incontinent bowel full assist hygiene stance at   Functional Standing Tolerance:  Time: 3 minutes  Activity: self care  Functional Standing Tolerance Comments: RW use F balance  Bed Mobility/Transfers: Bed Mobility  Bed Mobility: Yes  Bed Mobility 1  Bed Mobility 1: Supine to sitting  Level of Assistance 1: Minimum assistance  Bed Mobility Comments 1: bed flat no rail assist trunk up  Bed Mobility 2  Bed Mobility  2: Scooting  Level of Assistance 2: Close supervision  Bed Mobility Comments 2: seated to edge flat bed  Bed Mobility 3  Bed Mobility 3: Sitting to supine  Level of Assistance 3: Close supervision  Bed Mobility Comments 3: bed flat no rail    Transfers  Transfer: Yes  Transfer 1  Transfer From 1: Bed to  Transfer to 1: Stand  Technique 1: Sit to stand, Stand to sit  Transfer Device 1: Walker  Transfer Level of Assistance 1: Minimum assistance, Minimal verbal cues  Trials/Comments 1: cues hand placement   Sitting Balance:  Dynamic Sitting Balance  Dynamic Sitting-Balance Support: Feet supported  Dynamic Sitting-Level of Assistance: Distant supervision  Dynamic Sitting-Balance: Forward lean, Reaching for objects, Reaching across midline  Dynamic Sitting-Comments: during self care skills  Standing Balance:  Static Standing Balance  Static Standing-Balance  Support: Bilateral upper extremity supported  Static Standing-Level of Assistance: Contact guard  Static Standing-Comment/Number of Minutes: during self care RW use  Outcome Measures:[facility] Daily Activity  Putting on and taking off regular lower body clothing: A lot  Bathing (including washing, rinsing, drying): A lot  Putting on and taking off regular upper body clothing: A little  Toileting, which includes using toilet, bedpan or urinal: Total  Taking care of personal grooming such as brushing teeth: A little  Eating Meals: A little  Daily Activity - Total Score: 14    Education Documentation  Body Mechanics, taught by MEGHAN Pennington at 7/9/2025  1:25 PM.  Learner: Patient  Readiness: Acceptance  Method: Explanation, Demonstration, Teach-back  Response: Verbalizes Understanding, Demonstrated Understanding, Needs Reinforcement  Comment: Instructed Pt with balance strategies, adaptive ADL skills, body mechanics, safety in transfers    Precautions, taught by MEGHAN Pennnigton at 7/9/2025  1:25 PM.  Learner: Patient  Readiness: Acceptance  Method: Explanation, Demonstration, Teach-back  Response: Verbalizes Understanding, Demonstrated Understanding, Needs Reinforcement  Comment: Instructed Pt with balance strategies, adaptive ADL skills, body mechanics, safety in transfers    ADL Training, taught by MEGHAN Pennington at 7/9/2025  1:25 PM.  Learner: Patient  Readiness: Acceptance  Method: Explanation, Demonstration, Teach-back  Response: Verbalizes Understanding, Demonstrated Understanding, Needs Reinforcement  Comment: Instructed Pt with balance strategies, adaptive ADL skills, body mechanics, safety in transfers    Education Comments  No comments found.        OP EDUCATION:       Goals:  Encounter Problems       Encounter Problems (Active)       OT Goals       ADLs (Progressing)       Start:  07/08/25    Expected End:  07/22/25       Patient will complete ADL tasks, with set-up, supervision using AE need in  order to increase patient's safety and independence with self-care tasks.           Functional transfers (Progressing)       Start:  07/08/25    Expected End:  07/22/25       Patient will complete functional transfers with modified independent using least restrictive device, in order to increase patient's safety and independence with daily tasks.           Activity tolerance (Progressing)       Start:  07/08/25    Expected End:  07/22/25       Patient will demonstrate the ability to participate in functional activity at least >/= 25 minutes in order to increase patient's safety and independence with daily tasks.

## 2025-07-09 NOTE — PROGRESS NOTES
Rick Cabral is a 85 y.o. male on day 1 of admission presenting with Acute on chronic congestive heart failure, unspecified heart failure type.      Subjective     Patient denied chest pain.  He had swelling both lower extremities.       Objective     Last Recorded Vitals  BP (!) 127/96 (BP Location: Right arm, Patient Position: Lying)   Pulse 89   Temp 36.9 °C (98.4 °F) (Axillary)   Resp 18   Wt 85.3 kg (188 lb)   SpO2 96%   Intake/Output last 3 Shifts:    Intake/Output Summary (Last 24 hours) at 7/9/2025 1113  Last data filed at 7/9/2025 0940  Gross per 24 hour   Intake 560 ml   Output 1650 ml   Net -1090 ml       Admission Weight  Weight: 85.3 kg (188 lb) (07/07/25 1932)    Daily Weight  07/07/25 : 85.3 kg (188 lb)    Image Results  ECG 12 lead  Sinus rhythm with frequent Premature ventricular complexes and Fusion complexes  Left axis deviation  Incomplete right bundle branch block  Septal infarct , age undetermined  Abnormal ECG  When compared with ECG of 27-FEB-2022 14:13,  Fusion complexes are now Present  Septal infarct is now Present  T wave inversion more evident in Lateral leads      Physical Exam    General:  cooperating during physical exam.  HEENT: Pupils are equal and reactive to light and commendation , oral mucosa moist, no JVD.  Slight facial asymmetry from previous stroke  Cardiovascular:  PMI nondisplaced  Lungs: Clear to auscultation bilaterally, no wheezing, no crackles, no dullness to percussion.  Abdomen: No hepatosplenomegaly appreciated, soft , not tender, positive bowel sounds, positive bowel movement.  Neuro: Alert and oriented x2, strength in upper and lower extremities , sensation intact.  Psych: Patient had great insight was going on  Musculoskeletal: +1 pedal edema both lower extremities   vascular: Pulses are intact in upper and lower extremities  Skin: No petechiae, ecchymosis or other stigmata for dermatology disease.     Assessment and plan    Acute on chronic congestive heart  failure with systolic dysfunction  Continue with Lasix   Patient is on lisinopril and Coreg.  Cardiology on case.  Plan for 2D echo today.    Shortness of breath  Secondary to acute congestive heart failure  Clinically improved    History of stroke  Patient had stroke in 2022    Hypertension  Continue with current medication.    Deconditioning  Fall precaution  Ambulate with assistance  PT/ OT evaluated the patient  Moderate intensity level of care    DVT prophylaxis    I expect him to get discharge in 24 hours  Paulette López MD

## 2025-07-09 NOTE — CARE PLAN
The patient's goals for the shift include comfort and safety maintained    The clinical goals for the shift include decreased SOB      Problem: Heart Failure  Goal: Improved gas exchange this shift  Outcome: Progressing  Goal: Improved urinary output this shift  Outcome: Progressing  Goal: Reduction in peripheral edema within 24 hours  Outcome: Progressing  Goal: Report improvement of dyspnea/breathlessness this shift  Outcome: Progressing  Goal: Weight from fluid excess reduced over 2-3 days, then stabilize  Outcome: Progressing  Goal: Increase self care and/or family involvement in 24 hours  Outcome: Progressing     Problem: Pain - Adult  Goal: Verbalizes/displays adequate comfort level or baseline comfort level  Outcome: Progressing     Problem: Safety - Adult  Goal: Free from fall injury  Outcome: Progressing     Problem: Discharge Planning  Goal: Discharge to home or other facility with appropriate resources  Outcome: Progressing     Problem: Chronic Conditions and Co-morbidities  Goal: Patient's chronic conditions and co-morbidity symptoms are monitored and maintained or improved  Outcome: Progressing     Problem: Nutrition  Goal: Nutrient intake appropriate for maintaining nutritional needs  Outcome: Progressing     Problem: Skin  Goal: Decreased wound size/increased tissue granulation at next dressing change  Outcome: Progressing  Goal: Participates in plan/prevention/treatment measures  Outcome: Progressing  Goal: Prevent/manage excess moisture  Outcome: Progressing  Goal: Prevent/minimize sheer/friction injuries  Outcome: Progressing  Goal: Promote/optimize nutrition  Outcome: Progressing  Goal: Promote skin healing  Outcome: Progressing

## 2025-07-09 NOTE — PROGRESS NOTES
Physical Therapy                 Therapy Communication Note    Patient Name: Rick Cabral  MRN: [id number]  Department: [address]  Room: [address] [place], [place] [address]  Today's Date: 7/9/2025     Discipline: Physical Therapy    Missed Visit: PT Missed Visit: Yes     Missed Visit Reason: Missed Visit Reason: Other (Comment) (Attempted PT tx with pt currently working with GALVAN. Will attempt later if schedule allows.)    Missed Time: Attempt    Comment:

## 2025-07-09 NOTE — CARE PLAN
The patient's goals for the shift include comfort and safety maintained    The clinical goals for the shift include safety and decreased sob      Problem: Heart Failure  Goal: Improved gas exchange this shift  Outcome: Progressing  Goal: Improved urinary output this shift  Outcome: Progressing  Goal: Reduction in peripheral edema within 24 hours  Outcome: Progressing  Goal: Report improvement of dyspnea/breathlessness this shift  Outcome: Progressing  Goal: Weight from fluid excess reduced over 2-3 days, then stabilize  Outcome: Progressing  Goal: Increase self care and/or family involvement in 24 hours  Outcome: Progressing     Problem: Pain - Adult  Goal: Verbalizes/displays adequate comfort level or baseline comfort level  Outcome: Progressing     Problem: Safety - Adult  Goal: Free from fall injury  Outcome: Progressing     Problem: Discharge Planning  Goal: Discharge to home or other facility with appropriate resources  Outcome: Progressing     Problem: Chronic Conditions and Co-morbidities  Goal: Patient's chronic conditions and co-morbidity symptoms are monitored and maintained or improved  Outcome: Progressing     Problem: Nutrition  Goal: Nutrient intake appropriate for maintaining nutritional needs  Outcome: Progressing     Problem: Skin  Goal: Decreased wound size/increased tissue granulation at next dressing change  Outcome: Progressing  Goal: Participates in plan/prevention/treatment measures  Outcome: Progressing  Goal: Prevent/manage excess moisture  Outcome: Progressing  Goal: Prevent/minimize sheer/friction injuries  Outcome: Progressing  Goal: Promote/optimize nutrition  Outcome: Progressing  Goal: Promote skin healing  Outcome: Progressing

## 2025-07-09 NOTE — PROGRESS NOTES
Physical Therapy    Physical Therapy Treatment    Patient Name: Rick Cabral  MRN: [id number]  Department: [address]  Room: [address] [place] [address]  Today's Date: 7/9/2025  Time Calculation  Start Time: 1335  Stop Time: 1350  Time Calculation (min): 15 min         Assessment/Plan   PT Assessment  Rehab Prognosis: Good  Barriers to Discharge Home: Caregiver assistance, Cognition needs, Physical needs  Caregiver Assistance: Caregiver assistance needed per identified barriers - however, level of patient's required assistance exceeds assistance available at home  Cognition Needs: 24hr supervision for safety awareness needed  Physical Needs: Stair navigation into home limited by function/safety, Ambulating household distances limited by function/safety, 24hr mobility assistance needed, High falls risk due to function or environment  End of Session Communication: Bedside nurse  Assessment Comment: Pt limited this date 2/2 fatigue from previous OT session. Pt would continue to benefit from skilled therapy services to maximize safety and independence for return to PLOF.  End of Session Patient Position: Bed, 3 rail up, Alarm on  PT Plan  Inpatient/Swing Bed or Outpatient: Inpatient  PT Plan  Treatment/Interventions: Bed mobility, Transfer training, Gait training, Strengthening, Therapeutic exercise, Therapeutic activity, Balance training  PT Plan: Ongoing PT  PT Frequency: 4 times per week (during this acute inpatient hospitalization)  PT Discharge Recommendations: Moderate intensity level of continued care (Based on current functional status and rehab potential, pt was anticipated to tolerate and benefit from >/=5 days per week of skilled rehabilitative therapy after discharge from this acute inpatient hospitalization.)  PT Recommended Transfer Status: Assist x1, Assist x2, Assistive device (FWW)  PT - OK to Discharge: Yes    PT Visit Info:  PT Received On: 07/09/25     General Visit Information:   General  Reason for Referral: Impaired  functional mobility due to decreased muscular strength. This 85 year old was admitted for acute on chronic CHF.  Referred By: Dr Palma  Past Medical History Relevant to Rehab: BPH,CVA, systolic & diastolic HF, HTN, HLD, nicotine dependence  Family/Caregiver Present: Yes  Caregiver Feedback: ex spouse at bedside  Prior to Session Communication: Bedside nurse  Patient Position Received: Bed, 3 rail up, Alarm on  General Comment: Pt cleared for PT by nursing. Pt agreeable to PT services.    Subjective   Precautions:  Precautions  Medical Precautions: Fall precautions  Precautions Comment: + tele, + IV      Objective   Pain:  Pain Assessment  Pain Assessment: 0-10  0-10 (Numeric) Pain Score: 0 - No pain  Cognition:  Cognition  Orientation Level: Disoriented to time    Treatments:  Therapeutic Exercise  Therapeutic Exercise Performed: Yes  Therapeutic Exercise Activity 1: Supine BLE 2x15: SLR, hip abd, QS, heel slide, ankle pumps. Cues for technique and pacing with good carryover .    Bed Mobility  Bed Mobility: No (Pt states that he was just working with OT and entered bed. Requesting bed level exercises only this date 2/2 fatigue.)    Outcome Measures:  [facility] Basic Mobility  Turning from your back to your side while in a flat bed without using bedrails: A little  Moving from lying on your back to sitting on the side of a flat bed without using bedrails: A lot  Moving to and from bed to chair (including a wheelchair): A lot  Standing up from a chair using your arms (e.g. wheelchair or bedside chair): A little  To walk in hospital room: A lot  Climbing 3-5 steps with railing: A lot  Basic Mobility - Total Score: 14    Education Documentation  Mobility Training, taught by Sophia Rice PTA at 7/9/2025  2:05 PM.  Learner: Patient  Readiness: Acceptance  Method: Explanation, Demonstration  Response: Verbalizes Understanding, Needs Reinforcement  Comment: HEP outside of therapy, calling for assistance, fall  prevention, POC    Education Comments  No comments found.      Encounter Problems       Encounter Problems (Active)       PT Problem       Pt will transition supine<>sit with mod I.  (Not Progressing)       Start:  07/08/25    Expected End:  07/27/25            Pt will transfer sit<>stand with FWW & close S. (Not Progressing)       Start:  07/08/25    Expected End:  07/27/25            Pt will ambulate >/=25 ft with FWW & CGA. (Not Progressing)       Start:  07/08/25    Expected End:  07/27/25

## 2025-07-09 NOTE — PROGRESS NOTES
TCC spoke to patients daughter Dora who is pt POA. Introduced self and explained role. Patient lives home with son.  No reports of smoking or alcohol use.  [facility]. Patient has established LW and POA. TCC updated regarding therapy recommendations. State she would like to discuss with family before making a decision. List sent to review via link to cell phone     07/09/25 9456   Discharge Planning   Living Arrangements Children;Spouse/significant other   Support Systems Children;Spouse/significant other   Assistance Needed patient has grab bars shower chair and raised toilet seat.   Type of Residence Private residence   Number of Stairs to Enter Residence 4   Number of Stairs Within Residence 0   Do you have animals or pets at home? Yes   Type of Animals or Pets dog   Who is requesting discharge planning? Provider   Expected Discharge Disposition [facility]   Does the patient need discharge transport arranged? Yes   [facility] coordination needed? Yes   Financial Resource Strain   How hard is it for you to pay for the very basics like food, housing, medical care, and heating? Not hard   Housing Stability   In the last 12 months, was there a time when you were not able to pay the mortgage or rent on time? N   In the past 12 months, how many times have you moved where you were living? 0   At any time in the past 12 months, were you homeless or living in a shelter (including now)? N   Transportation Needs   In the past 12 months, has lack of transportation kept you from medical appointments or from getting medications? no   In the past 12 months, has lack of transportation kept you from meetings, work, or from getting things needed for daily living? No

## 2025-07-09 NOTE — PROGRESS NOTES
Rick Cabral is a 85 y.o. male on day 2 of admission presenting with Acute on chronic congestive heart failure, unspecified heart failure type.    Subjective   Alert and oriented x 3.  Denies complaints of chest pain or pressure palpitations or feeling rapid heart rate.  Breathing comfortably on room air.  Peripheral edema resolved       Objective     Physical Exam  Vitals and nursing note reviewed.   Constitutional:       General: He is not in acute distress.     Appearance: He is not ill-appearing or toxic-appearing.   HENT:      Head: Normocephalic and atraumatic.      Nose: Nose normal.      Mouth/Throat:      Mouth: Mucous membranes are moist.      Pharynx: Oropharynx is clear.   Cardiovascular:      Rate and Rhythm: Normal rate and regular rhythm.      Pulses: Normal pulses.      Heart sounds: Normal heart sounds. No murmur heard.     No friction rub. No gallop.   Pulmonary:      Effort: Pulmonary effort is normal.      Breath sounds: Normal breath sounds.      Comments: Clear, poor inspiratory effort.  Diminished in bases mildly.  Room air.  No conversational dyspnea.  No pain with deep aspiration  Abdominal:      General: Bowel sounds are normal.      Palpations: Abdomen is soft.   Musculoskeletal:      Cervical back: Normal range of motion.      Right lower leg: No edema.      Left lower leg: No edema.   Skin:     General: Skin is warm and dry.      Capillary Refill: Capillary refill takes less than 2 seconds.   Neurological:      Mental Status: He is alert. Mental status is at baseline.         Last Recorded Vitals  Blood pressure (!) 127/96, pulse 89, temperature 36.9 °C (98.4 °F), temperature source Axillary, resp. rate 18, height 1.829 m (6'), weight 85.3 kg (188 lb), SpO2 96%.  Intake/Output last 3 Shifts:  I/O last 3 completed shifts:  In: 540 (6.3 mL/kg) [P.O.:540]  Out: 1450 (17 mL/kg) [Urine:1450 (0.5 mL/kg/hr)]  Weight: 85.3 kg     Relevant Results  Results for orders placed or performed during  the hospital encounter of 07/07/25 (from the past 24 hours)   Transthoracic Echo Complete   Result Value Ref Range    BSA 2.08 m2   Basic metabolic panel   Result Value Ref Range    Glucose 89 74 - 99 mg/dL    Sodium 141 136 - 145 mmol/L    Potassium 3.7 3.5 - 5.3 mmol/L    Chloride 104 98 - 107 mmol/L    Bicarbonate 29 21 - 32 mmol/L    Anion Gap 12 10 - 20 mmol/L    Urea Nitrogen 26 (H) 6 - 23 mg/dL    Creatinine 1.19 0.50 - 1.30 mg/dL    eGFR 60 (L) >60 mL/min/1.73m*2    Calcium 8.5 (L) 8.6 - 10.3 mg/dL             Assessment & Plan  Acute on chronic congestive heart failure, unspecified heart failure type    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  With diastolic dysfunction.  Has peripheral edema and shortness of breath with evidence of fluid limb overload on chest x-ray and an elevated proBNP over 1200.  Certainly agree with congestive failure.  Will consult cardiac rehab and heart failure navigator.  Check echo.  Continue with IV diuretics.  Start low-dose beta-blocker.  Start low-dose ACE inhibitor.  Will wait on SGLT2 inhibitor to ensure the patient's creatinine is stable.  Shortness of breath: As above.  Peripheral edema: As above.  Hypertensive disorder: Mildly hypertensive.  Have initiated beta-blocker and ACE inhibitor.  History of stroke: Reports no acute strokelike symptoms.     Overall impression:     7/8: As above.  Certainly appears to have an exacerbation of systolic and diastolic heart failure.  Have initiated guideline directed therapy with the exception of SGLT2 inhibitor.  Would like toassess response from kidneys prior to initiating a third agent.  Check echo as last echo was approximately 3 years ago.   anticipate a further 48 hours to achieve euvolemic state.  Will follow with you.    7/9: Improved over the past 24 hours.  Patient received IV diuretics yesterday and his peripheral edema has resolved.  He is breathing  comfortably on room air with no conversational dyspnea.  Echocardiogram complete, interpretation pending with Dr. George.  His previous echocardiogram from 3 years ago was noted to have reduced ejection fraction of 35%.  Patient has been initiated on guideline directed therapy utilizing a beta-blocker, and ACE inhibitor, I have added an SGLT2 inhibitor today noting a creatinine of 1.19.  Believe we can stop IV diuretics.  Have converted the patient to oral furosemide 20 mg daily.  Echo returns with a worsened ejection fraction of approximately 20 to 25%.  In this range there is concern for ventricular ectopy.  Will discuss further with the patient in the morning consideration of cardiac catheterization and/or LifeVest.  N.p.o. after midnight.     I spent 35 minutes in the professional and overall care of this patient.      Wong Augustine, APRN-CNP

## 2025-07-10 PROBLEM — I42.9 CARDIOMYOPATHY: Status: ACTIVE | Noted: 2025-07-07

## 2025-07-10 LAB
ANION GAP SERPL CALCULATED.3IONS-SCNC: 13 MMOL/L (ref 10–20)
BUN SERPL-MCNC: 26 MG/DL (ref 6–23)
CALCIUM SERPL-MCNC: 8.6 MG/DL (ref 8.6–10.3)
CHLORIDE SERPL-SCNC: 104 MMOL/L (ref 98–107)
CO2 SERPL-SCNC: 28 MMOL/L (ref 21–32)
CREAT SERPL-MCNC: 1.3 MG/DL (ref 0.5–1.3)
EGFRCR SERPLBLD CKD-EPI 2021: 54 ML/MIN/1.73M*2
GLUCOSE SERPL-MCNC: 89 MG/DL (ref 74–99)
POTASSIUM SERPL-SCNC: 3.9 MMOL/L (ref 3.5–5.3)
SODIUM SERPL-SCNC: 141 MMOL/L (ref 136–145)

## 2025-07-10 PROCEDURE — 2500000001 HC RX 250 WO HCPCS SELF ADMINISTERED DRUGS (ALT 637 FOR MEDICARE OP): Performed by: NURSE PRACTITIONER

## 2025-07-10 PROCEDURE — 1200000002 HC GENERAL ROOM WITH TELEMETRY DAILY

## 2025-07-10 PROCEDURE — 2500000004 HC RX 250 GENERAL PHARMACY W/ HCPCS (ALT 636 FOR OP/ED): Performed by: NURSE PRACTITIONER

## 2025-07-10 PROCEDURE — 36415 COLL VENOUS BLD VENIPUNCTURE: CPT | Performed by: INTERNAL MEDICINE

## 2025-07-10 PROCEDURE — 99239 HOSP IP/OBS DSCHRG MGMT >30: CPT | Performed by: INTERNAL MEDICINE

## 2025-07-10 PROCEDURE — 97116 GAIT TRAINING THERAPY: CPT | Mod: GP,CQ

## 2025-07-10 PROCEDURE — 2500000001 HC RX 250 WO HCPCS SELF ADMINISTERED DRUGS (ALT 637 FOR MEDICARE OP): Performed by: INTERNAL MEDICINE

## 2025-07-10 PROCEDURE — 2500000004 HC RX 250 GENERAL PHARMACY W/ HCPCS (ALT 636 FOR OP/ED): Performed by: INTERNAL MEDICINE

## 2025-07-10 PROCEDURE — 82374 ASSAY BLOOD CARBON DIOXIDE: CPT | Performed by: INTERNAL MEDICINE

## 2025-07-10 PROCEDURE — 97110 THERAPEUTIC EXERCISES: CPT | Mod: GP,CQ

## 2025-07-10 RX ORDER — LISINOPRIL 2.5 MG/1
2.5 TABLET ORAL DAILY
Start: 2025-07-11 | End: 2025-08-10

## 2025-07-10 RX ORDER — ACETAMINOPHEN 500 MG
5 TABLET ORAL NIGHTLY PRN
Start: 2025-07-10 | End: 2025-08-09

## 2025-07-10 RX ORDER — DAPAGLIFLOZIN 5 MG/1
5 TABLET, FILM COATED ORAL DAILY
Start: 2025-07-11

## 2025-07-10 RX ORDER — ACETAMINOPHEN 325 MG/1
650 TABLET ORAL EVERY 6 HOURS PRN
Start: 2025-07-10 | End: 2025-07-15

## 2025-07-10 RX ORDER — FUROSEMIDE 20 MG/1
20 TABLET ORAL DAILY
Start: 2025-07-11

## 2025-07-10 RX ORDER — CARVEDILOL 3.12 MG/1
3.12 TABLET ORAL
Start: 2025-07-10 | End: 2025-08-09

## 2025-07-10 RX ORDER — SODIUM CHLORIDE 9 MG/ML
50 INJECTION, SOLUTION INTRAVENOUS CONTINUOUS
Status: ACTIVE | OUTPATIENT
Start: 2025-07-10 | End: 2025-07-10

## 2025-07-10 RX ORDER — ALUMINUM HYDROXIDE, MAGNESIUM HYDROXIDE, AND SIMETHICONE 1200; 120; 1200 MG/30ML; MG/30ML; MG/30ML
30 SUSPENSION ORAL 4 TIMES DAILY PRN
Start: 2025-07-10 | End: 2025-07-17

## 2025-07-10 RX ADMIN — HEPARIN SODIUM 5000 UNITS: 5000 INJECTION, SOLUTION INTRAVENOUS; SUBCUTANEOUS at 21:31

## 2025-07-10 RX ADMIN — FUROSEMIDE 20 MG: 20 TABLET ORAL at 10:00

## 2025-07-10 RX ADMIN — SODIUM CHLORIDE 50 ML/HR: 900 INJECTION, SOLUTION INTRAVENOUS at 08:31

## 2025-07-10 RX ADMIN — CARVEDILOL 3.12 MG: 3.12 TABLET, FILM COATED ORAL at 17:39

## 2025-07-10 RX ADMIN — CARVEDILOL 3.12 MG: 3.12 TABLET, FILM COATED ORAL at 10:00

## 2025-07-10 RX ADMIN — LISINOPRIL 2.5 MG: 2.5 TABLET ORAL at 10:00

## 2025-07-10 RX ADMIN — HEPARIN SODIUM 5000 UNITS: 5000 INJECTION, SOLUTION INTRAVENOUS; SUBCUTANEOUS at 10:00

## 2025-07-10 RX ADMIN — ACETAMINOPHEN 650 MG: 325 TABLET ORAL at 21:31

## 2025-07-10 RX ADMIN — DAPAGLIFLOZIN 5 MG: 10 TABLET, FILM COATED ORAL at 10:00

## 2025-07-10 ASSESSMENT — COGNITIVE AND FUNCTIONAL STATUS - GENERAL
CLIMB 3 TO 5 STEPS WITH RAILING: TOTAL
DAILY ACTIVITIY SCORE: 13
DRESSING REGULAR UPPER BODY CLOTHING: A LOT
EATING MEALS: A LITTLE
HELP NEEDED FOR BATHING: A LOT
TURNING FROM BACK TO SIDE WHILE IN FLAT BAD: A LITTLE
MOVING FROM LYING ON BACK TO SITTING ON SIDE OF FLAT BED WITH BEDRAILS: A LITTLE
MOBILITY SCORE: 16
DRESSING REGULAR LOWER BODY CLOTHING: A LOT
TURNING FROM BACK TO SIDE WHILE IN FLAT BAD: A LITTLE
MOBILITY SCORE: 14
WALKING IN HOSPITAL ROOM: A LITTLE
MOVING TO AND FROM BED TO CHAIR: A LITTLE
STANDING UP FROM CHAIR USING ARMS: A LOT
PERSONAL GROOMING: A LOT
CLIMB 3 TO 5 STEPS WITH RAILING: TOTAL
WALKING IN HOSPITAL ROOM: A LOT
STANDING UP FROM CHAIR USING ARMS: A LITTLE
MOVING TO AND FROM BED TO CHAIR: A LOT
TOILETING: A LOT

## 2025-07-10 ASSESSMENT — PAIN - FUNCTIONAL ASSESSMENT
PAIN_FUNCTIONAL_ASSESSMENT: 0-10

## 2025-07-10 ASSESSMENT — PAIN DESCRIPTION - DESCRIPTORS: DESCRIPTORS: ACHING

## 2025-07-10 ASSESSMENT — PAIN SCALES - GENERAL
PAINLEVEL_OUTOF10: 0 - NO PAIN
PAINLEVEL_OUTOF10: 0 - NO PAIN
PAINLEVEL_OUTOF10: 2
PAINLEVEL_OUTOF10: 0 - NO PAIN

## 2025-07-10 NOTE — NURSING NOTE
AM shift assessment unchanged, pt is A&Ox2-3 in no acute distress, resting in bed with call light in reach, no new events/concerns this shift. Bed alarm on

## 2025-07-10 NOTE — DISCHARGE SUMMARY
Discharge Diagnosis  Acute on chronic congestive heart failure, unspecified heart failure type           Issues Requiring Follow-Up  Congestive heart failure with systolic dysfunction.  History of stroke  Hypertension  Deconditioning      Discharge Meds     Medication List      START taking these medications     acetaminophen 325 mg tablet; Commonly known as: Tylenol; Take 2 tablets   (650 mg) by mouth every 6 hours if needed for moderate pain (4 - 6) for up   to 5 days.   alum-mag hydroxide-simeth 200-200-20 mg/5 mL oral suspension; Commonly   known as: Mylanta; Take 30 mL by mouth 4 times a day as needed for   indigestion or heartburn for up to 7 days.   carvedilol 3.125 mg tablet; Commonly known as: Coreg; Take 1 tablet   (3.125 mg) by mouth 2 times daily (morning and late afternoon).   dapagliflozin propanediol 5 mg tablet; Commonly known as: Farxiga; Take   1 tablet (5 mg) by mouth once daily.; Start taking on: July 11, 2025   furosemide 20 mg tablet; Commonly known as: Lasix; Take 1 tablet (20 mg)   by mouth once daily.; Start taking on: July 11, 2025   lisinopril 2.5 mg tablet; Take 1 tablet (2.5 mg) by mouth once daily.;   Start taking on: July 11, 2025   melatonin 5 mg tablet; Take 1 tablet (5 mg) by mouth as needed at   bedtime for sleep.     CONTINUE taking these medications     aspirin 81 mg EC tablet   cholecalciferol 50 mcg (2,000 units) tablet; Commonly known as: Vitamin   D-3   clopidogrel 75 mg tablet; Commonly known as: Plavix   finasteride 5 mg tablet; Commonly known as: Proscar   rosuvastatin 5 mg tablet; Commonly known as: Crestor   tamsulosin 0.4 mg 24 hr capsule; Commonly known as: Flomax     STOP taking these medications     amLODIPine 5 mg tablet; Commonly known as: Norvasc   carbamide peroxide 6.5 % otic solution; Commonly known as: Debrox       Test Results Pending At Discharge  Pending Labs       Order Current Status    Extra Tubes Preliminary result    Lavender Top Preliminary result             Hospital Course     Patient is an 85 years old male with past medical history of hypertension, CVA in 2022 hyperlipidemia, BPH.  Patient presented to [facility] ER complaining of swelling of both lower extremity.  Patient had shortness of breath.  Patient was admitted to hospital for further evaluation and treatment of his exam patient was found to have +1 pedal edema both lower extremity.  Patient has history of congestive heart failure with systolic dysfunction.  Cardiology was consulted.  Patient was started on IV Lasix initially.  2D echo done in hospital revealed ejection fraction 20 to 25%.  Cardiology discussed with the patient and his wife.  Cardiology suggested cardiac cath to be done today.  Patient and his wife refused cardiac cath.  They wanted first to try with medication.  Patient and family understood risk if he does not get cardiac cath.  Discussed in detail with cardiology.  Patient was started on Coreg, lisinopril, Lasix.  The patient was advised regarding risk and benefits being on this medication.Patient will get LifeVest.  He was evaluated by physical therapy physical therapy recommended moderate intensity level of care.  I discussed with care coordination of the case.  Plan to discharge the patient to skilled nursing facility in a.m. he needs 1 more midnight to be eligible for skilled nursing facility.  Patient will be discharged in a.m.      Pertinent Physical Exam At Time of Discharge  Physical Exam  General: In non acute distress, cooperating during physical exam.  HEENT: Pupils are equal and reactive to light and commendation , oral mucosa moist, no JVD   Cardiovascular: PMI nondisplaced  Lungs: Clear to auscultation bilaterally, no wheezing, no crackles, no dullness to percussion.  Abdomen: No hepatosplenomegaly appreciated, soft , not tender, positive bowel sounds, positive bowel movement.  Neuro: Alert and oriented x2, strength in upper and lower extremities , sensation  intact.  Musculoskeletal: No swelling in lower extremities, no limitation in range of motion.  Vascular: Pulses are intact in upper and lower extremities  Skin: No petechiae, ecchymosis or other stigmata for dermatology disease.   Outpatient Follow-Up  No future appointments.      Follow-up with Dr. George in 2 weeks  Follow-up with PCP in 2 weeks  Time spent discharging patient 36 minutes  Paulette López MD

## 2025-07-10 NOTE — PROGRESS NOTES
07/10/25 1019   Discharge Planning   Expected Discharge Disposition SNF   Does the patient need discharge transport arranged? Yes   [employer] coordination needed? Yes   Has discharge transport been arranged? No     Patient's wife, Katharina, provided choices for SNF. She is requesting 1) [facility] or 2) [facility]. Referrals submitted to the same.     10:49 AM  [facility] has accepted the patient. Patient is eligible for SNF starting tomorrow, 7/11. Plan is for patient to have heart cath today. Patient can be discharged to [place] when medically appropriate.     12:38 PM  Patient's wife updated to the above and made aware of plan for discharge tomorrow per physician.     3:00 PM  [facility][id number]. Care Transitions will continue to follow.

## 2025-07-10 NOTE — PROGRESS NOTES
Rick Cabral is a 85 y.o. male on day 3 of admission presenting with Acute on chronic congestive heart failure, unspecified heart failure type.    Subjective   Alert and oriented.  Denies complaints chest pain or pressure palpitations or feeling of rapid heart rate.       Objective     Physical Exam  Vitals and nursing note reviewed.   Constitutional:       General: He is not in acute distress.     Appearance: He is not ill-appearing or toxic-appearing.   HENT:      Head: Normocephalic and atraumatic.      Nose: Nose normal.      Mouth/Throat:      Mouth: Mucous membranes are moist.      Pharynx: Oropharynx is clear.   Cardiovascular:      Rate and Rhythm: Normal rate and regular rhythm.      Pulses: Normal pulses.      Heart sounds: Normal heart sounds. No murmur heard.     No friction rub. No gallop.   Pulmonary:      Effort: Pulmonary effort is normal.      Breath sounds: Normal breath sounds.   Abdominal:      General: Bowel sounds are normal.      Palpations: Abdomen is soft.   Musculoskeletal:         General: Normal range of motion.      Cervical back: Normal range of motion.      Right lower leg: No edema.      Left lower leg: No edema.   Skin:     General: Skin is warm and dry.      Capillary Refill: Capillary refill takes less than 2 seconds.   Neurological:      Mental Status: He is alert and oriented to person, place, and time. Mental status is at baseline.         Last Recorded Vitals  Blood pressure 113/88, pulse 76, temperature 36.9 °C (98.4 °F), temperature source Axillary, resp. rate 16, height 1.829 m (6'), weight 85.3 kg (188 lb), SpO2 97%.  Intake/Output last 3 Shifts:  I/O last 3 completed shifts:  In: 120 (1.4 mL/kg) [P.O.:120]  Out: 2650 (31.1 mL/kg) [Urine:2650 (0.9 mL/kg/hr)]  Weight: 85.3 kg     Relevant Results  Results for orders placed or performed during the hospital encounter of 07/07/25 (from the past 24 hours)   Basic metabolic panel   Result Value Ref Range    Glucose 89 74 - 99  mg/dL    Sodium 141 136 - 145 mmol/L    Potassium 3.9 3.5 - 5.3 mmol/L    Chloride 104 98 - 107 mmol/L    Bicarbonate 28 21 - 32 mmol/L    Anion Gap 13 10 - 20 mmol/L    Urea Nitrogen 26 (H) 6 - 23 mg/dL    Creatinine 1.30 0.50 - 1.30 mg/dL    eGFR 54 (L) >60 mL/min/1.73m*2    Calcium 8.6 8.6 - 10.3 mg/dL     Transthoracic Echo Complete  Result Date: 7/9/2025           [place] [address]            Phone [phone] TRANSTHORACIC ECHOCARDIOGRAM REPORT Patient Name:       CHACHA Melara Physician:    [id number] Sanchez George MD Study Date:         7/8/2025             Ordering Provider:    [id number] NAM CARRASCO MRN/PID:            [id number]             Fellow: Accession#:         [id number]         Nurse: Date of Birth/Age:  1940 / 85 years Sonographer:          Carley Craft                                                                PEGGY Gender Assigned at  M                    Additional Staff: Birth: Height:             182.88 cm            Admit Date: Weight:             85.28 kg             Admission Status:     Inpatient -                                                                Routine BSA / BMI:          2.08 m2 / 25.50      Department Location:  [facility]                     kg/m2 Blood Pressure: 148 /92 mmHg Study Type:    TRANSTHORACIC ECHO (TTE) COMPLETE Diagnosis/ICD: Heart failure, unspecified-I50.9 Indication:    Acute congestive heart failure CPT Codes:     Echo Complete w Full [id number] Patient History: Pertinent History: CHF, HTN and Stroke Smoker. Study Detail: Definity used as a contrast agent for endocardial border               definition. Total contrast used for this procedure was 2 mL via IV               push.  PHYSICIAN INTERPRETATION: Left Ventricle: The left ventricular systolic  function is severely decreased with a visually estimated ejection fraction of 20-25%. There is global hypokinesis of the left ventricle with minor regional variations. The left ventricular cavity size is normal. There is mild increased septal and mildly increased posterior left ventricular wall thickness. There is left ventricular concentric remodeling. Spectral Doppler shows a Grade II (pseudonormal pattern) of left ventricular diastolic filling with an elevated left atrial pressure. Left Atrium: The left atrial size is mildly dilated. Right Ventricle: The right ventricle is normal in size. There is reduced right ventricular systolic function. Right Atrium: The right atrial size is mild to moderately dilated. Aortic Valve: The aortic valve was not well visualized. The aortic valve area by VTI is 1.61 cmï¿½ with a peak velocity of 1.06 m/s. The peak and mean gradients are 4 mmHg and 2 mmHg, respectively, with a dimensionless index of 0.51. There is no evidence of aortic valve regurgitation. Mitral Valve: The mitral valve is normal in structure. The doppler estimated peak and mean diastolic gradients are 2 mmHg and 1 mmHg, respectively. There is mild mitral valve regurgitation. The E Vmax is 0.86 m/s. The mitral regurgitant orifice area is 7 mm2. The mitral regurgitant volume is 10.81 ml. Tricuspid Valve: The tricuspid valve is structurally normal. There is mild tricuspid regurgitation. The Doppler estimated right ventricular systolic pressure (RVSP) is mildly elevated at 42 mmHg. Pulmonic Valve: The pulmonic valve is structurally normal. There is no indication of pulmonic valve regurgitation. Pericardium: Trivial pericardial effusion. Aorta: The aortic root is normal. There is evidence of mild ascending aortic aneurysm of 41 mm at the largest diameter. Systemic Veins: The inferior vena cava appears mildly dilated, with IVC inspiratory collapse greater than 50%. In comparison to the previous echocardiogram(s):  Compared with study dated 3/1/2023,.  CONCLUSIONS:  1. Poorly visualized anatomical structures due to suboptimal image quality.  2. The left ventricular systolic function is severely decreased with a visually estimated ejection fraction of 20-25%.  3. There is global hypokinesis of the left ventricle with minor regional variations.  4. Spectral Doppler shows a Grade II (pseudonormal pattern) of left ventricular diastolic filling with an elevated left atrial pressure.  5. There is reduced right ventricular systolic function.  6. The right atrial size is mild to moderately dilated.  7. Mild mitral valve regurgitation.  8. Mild tricuspid regurgitation is visualized.  9. The Doppler estimated RVSP is mildly elevated at 42 mmHg. 10. There is evidence of mild ascending aortic aneurysm of 41 mm at the largest diameter. 11. The inferior vena cava appears mildly dilated, with IVC inspiratory collapse greater than 50%. QUANTITATIVE DATA SUMMARY:  2D MEASUREMENTS:             Normal Ranges: LAs:             3.90 cm     (2.7-4.0cm) IVSd:            1.09 cm     (0.6-1.1cm) LVPWd:           1.16 cm     (0.6-1.1cm) LVIDd:           5.26 cm     (3.9-5.9cm) LVIDs:           4.39 cm LV Mass Index:   111.8 g/m2 LVEDV Index:     80.98 ml/m2 LV % FS          16.5 %  LEFT ATRIUM:                  Normal Ranges: LA Vol A4C:        75.2 ml    (22+/-6mL/m2) LA Vol A2C:        105.5 ml LA Vol BP:         90.5 ml LA Vol Index A4C:  36.2ml/m2 LA Vol Index A2C:  50.8 ml/m2 LA Vol Index BP:   43.6 ml/m2 LA Area A4C:       22.1 cm2 LA Area A2C:       26.6 cm2 LA Major Axis A4C: 5.5 cm LA Major Axis A2C: 5.7 cm LA Vol A4C:        71.1 ml LA Vol A2C:        97.1 ml LA Vol Index BSA:  40.5 ml/m2  RIGHT ATRIUM:                 Normal Ranges: RA Vol A4C:        68.7 ml    (8.3-19.5ml) RA Vol Index A4C:  33.1 ml/m2 RA Area A4C:       19.9 cm2 RA Major Axis A4C: 4.9 cm  M-MODE MEASUREMENTS:         Normal Ranges: Ao Root:             3.70 cm (2.0-3.7cm)  AoV Exc:             1.50 cm (1.5-2.5cm) LAs:                 3.80 cm (2.7-4.0cm)  AORTA MEASUREMENTS:         Normal Ranges: AoV Exc:            1.50 cm (1.5-2.5cm) Asc Ao, d:          4.10 cm (2.1-3.4cm)  LV SYSTOLIC FUNCTION:                      Normal Ranges: EF-A4C View:    28 % (>=55%) EF-A2C View:    15 % EF-Biplane:     23 % EF-Visual:      23 % LV EF Reported: 23 %  LV DIASTOLIC FUNCTION:             Normal Ranges: MV Peak E:             0.86 m/s    (0.7-1.2 m/s) MV Peak A:             0.68 m/s    (0.42-0.7 m/s) E/A Ratio:             1.27        (1.0-2.2) PulmV Sys Rakesh:         41.70 cm/s PulmV Miguel Rakesh:        36.70 cm/s PulmV S/D Rakesh:         1.10 PulmV A Revs Rakesh:      23.60 cm/s PulmV A Revs Dur:      121.00 msec  MITRAL VALVE:          Normal Ranges: MV Vmax:      0.77 m/s (<=1.3m/s) MV peak P.4 mmHg (<5mmHg) MV mean P.0 mmHg (<48mmHg) MV DT:        173 msec (150-240msec)  MITRAL INSUFFICIENCY:             Normal Ranges: PISA Radius:          0.4 cm MR VTI:               155.00 cm MR Vmax:              444.00 cm/s MR Alias Rakesh:         30.8 cm/s MR Volume:            10.81 ml MR Flow Rt:           30.96 ml/s MR EROA:              7 mm2  AORTIC VALVE:                     Normal Ranges: AoV Vmax:                1.06 m/s (<=1.7m/s) AoV Peak P.5 mmHg (<20mmHg) AoV Mean P.0 mmHg (1.7-11.5mmHg) LVOT Max Rakesh:            0.61 m/s (<=1.1m/s) AoV VTI:                 21.50 cm (18-25cm) LVOT VTI:                11.00 cm LVOT Diameter:           2.00 cm  (1.8-2.4cm) AoV Area, VTI:           1.61 cm2 (2.5-5.5cm2) AoV Area,Vmax:           1.81 cm2 (2.5-4.5cm2) AoV Dimensionless Index: 0.51  RIGHT VENTRICLE: RV Basal 4.04 cm RV Mid   2.66 cm RV Major 9.7 cm TAPSE:   20.9 mm  TRICUSPID VALVE/RVSP:          Normal Ranges: Peak TR Velocity:     2.91 m/s Est. 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  With diastolic dysfunction.  Has peripheral edema and shortness of breath with evidence of fluid limb overload on chest x-ray and an elevated proBNP over 1200.  Certainly agree with congestive failure.  Will consult cardiac rehab and heart failure navigator.  Check echo.  Continue with IV diuretics.  Start low-dose beta-blocker.  Start low-dose ACE inhibitor.  Will wait on SGLT2 inhibitor to ensure the patient's creatinine is stable.  Nonischemic cardiomyopathy: Known reduced ejection fraction of 30 to 35%, this is acutely worsened to 20%.  Patient has been initiated on guideline directed therapy.  Patient is not interested in cardiac catheterization but would like LifeVest/ICD placement if required in the future.  I certainly have suspicion for advancing coronary disease, however again with patient and family are not interested in a cardiac catheterization.  Shortness of breath: As above.  Peripheral edema: As above.  Hypertensive disorder: Mildly hypertensive.  Have initiated beta-blocker and ACE inhibitor.  History of stroke: Reports no acute strokelike symptoms.     Overall impression:     : As above.  Certainly appears to have an exacerbation of systolic and diastolic heart failure.  Have initiated guideline directed therapy with the exception of SGLT2 inhibitor.  Would like toassess response from kidneys prior to initiating a third agent.  Check echo as last echo was approximately  3 years ago.   anticipate a further 48 hours to achieve euvolemic state.  Will follow with you.     7/9: Improved over the past 24 hours.  Patient received IV diuretics yesterday and his peripheral edema has resolved.  He is breathing comfortably on room air with no conversational dyspnea.  Echocardiogram complete, interpretation pending with Dr. George.  His previous echocardiogram from 3 years ago was noted to have reduced ejection fraction of 35%.  Patient has been initiated on guideline directed therapy utilizing a beta-blocker, and ACE inhibitor, I have added an SGLT2 inhibitor today noting a creatinine of 1.19.  Believe we can stop IV diuretics.  Have converted the patient to oral furosemide 20 mg daily.  Echo returns with a worsened ejection fraction of approximately 20 to 25%.  In this range there is concern for ventricular ectopy.  Will discuss further with the patient in the morning consideration of cardiac catheterization and/or LifeVest.  N.p.o. after midnight.     7/10: Stable overnight.  Denies complaints of chest pain or pressure palpitations or feeling rapid heart rate.  He is breathing comfortably on room air.  No peripheral edema.  Echocardiogram yesterday does reveal a new reduced ejection fraction of 20 to 25%.  This is worse from previous of 30 to 35%.  Had a thorough conversation with the patient who was considering cardiac catheterization and LifeVest.  We have contacted the patient's wife who after consideration with the family does not wish for the patient to undergo cardiac catheterization.  The patient's family and patient do wish for LifeVest placement.  Cleared for discharge from the cardiac perspective once LifeVest is placed.  Patient should follow-up with Dr. George in the outpatient setting in the next 2 to 3 weeks.    I spent 35 minutes in the professional and overall care of this patient.      Wong Augustine, APRN-CNP

## 2025-07-10 NOTE — PROGRESS NOTES
Physical Therapy    Physical Therapy Treatment    Patient Name: Rikc Cabral  MRN: [id number]  Department: [address]  Room: [address] [place] [address]  Today's Date: 7/10/2025  Time Calculation  Start Time: 0926  Stop Time: 0958  Time Calculation (min): 32 min         Assessment/Plan   PT Assessment  Rehab Prognosis: Good  Barriers to Discharge Home: Caregiver assistance, Cognition needs, Physical needs  Caregiver Assistance: Caregiver assistance needed per identified barriers - however, level of patient's required assistance exceeds assistance available at home  Cognition Needs: 24hr supervision for safety awareness needed  Physical Needs: Stair navigation into home limited by function/safety, Ambulating household distances limited by function/safety, 24hr mobility assistance needed, High falls risk due to function or environment  End of Session Communication: Bedside nurse  Assessment Comment: Pt progressing steadily towards stated goals. Pt req'd Yisel for safe mobility this session. Pt would continue to benefit from skilled therapy services to maximize safety and independence.  End of Session Patient Position: Up in chair, Alarm on  PT Plan  Inpatient/Swing Bed or Outpatient: Inpatient  PT Plan  Treatment/Interventions: Bed mobility, Transfer training, Gait training, Strengthening, Therapeutic exercise, Therapeutic activity, Balance training  PT Plan: Ongoing PT  PT Frequency: 4 times per week (during this acute inpatient hospitalization)  PT Discharge Recommendations: Moderate intensity level of continued care (Based on current functional status and rehab potential, pt was anticipated to tolerate and benefit from >/=5 days per week of skilled rehabilitative therapy after discharge from this acute inpatient hospitalization.)  PT Recommended Transfer Status: Assist x1, Assist x2, Assistive device (FWW)  PT - OK to Discharge: Yes    PT Visit Info:  PT Received On: 07/10/25     General Visit Information:   General  Reason for Referral:  Impaired functional mobility due to decreased muscular strength. This 85 year old was admitted for acute on chronic CHF.  Referred By: Dr Palma  Past Medical History Relevant to Rehab: BPH,CVA, systolic & diastolic HF, HTN, HLD, nicotine dependence  Family/Caregiver Present: Yes  Caregiver Feedback: ex spouse at bedside at beginning of session  Prior to Session Communication: Bedside nurse  Patient Position Received: Bed, 3 rail up, Alarm on  General Comment: Pt cleared for PT by nursing. Pt agreeable to PT services.    Subjective   Precautions:  Precautions  Medical Precautions: Fall precautions  Precautions Comment: + tele, + IV    Objective   Pain:  Pain Assessment  Pain Assessment: 0-10  0-10 (Numeric) Pain Score: 0 - No pain  Cognition:  Cognition  Overall Cognitive Status: Within Functional Limits  Orientation Level: Oriented X4    Postural Control:  Static Sitting Balance  Static Sitting-Balance Support: Bilateral upper extremity supported, Feet supported  Static Sitting-Level of Assistance: Close supervision  Static Sitting-Comment/Number of Minutes: good seated static balance  Static Standing Balance  Static Standing-Balance Support: Bilateral upper extremity supported  Static Standing-Level of Assistance: Contact guard  Static Standing-Comment/Number of Minutes: fair + static stand balance    Treatments:  Therapeutic Exercise  Therapeutic Exercise Performed: Yes  Therapeutic Exercise Activity 1: Seated BLE x20: LAQ, marches, hip abd/add with light resist, ankle pumps    Bed Mobility  Bed Mobility: Yes  Bed Mobility 1  Bed Mobility 1: Supine to sitting  Level of Assistance 1: Minimum assistance  Bed Mobility Comments 1: HOB elevated, use of bed rails. Yisel to raise trunk. Cues for hand placement. Increased time to scoot hips to EOB.    Ambulation/Gait Training  Ambulation/Gait Training Performed: Yes  Ambulation/Gait Training 1  Surface 1: Level tile  Device 1: Rolling walker  Assistance 1: Minimum  assistance  Quality of Gait 1: Forward flexed posture, Shuffling gait, Decreased step length  Comments/Distance (ft) 1: 5 steps fwd/back/lateral to L/R. One instance of posterior LOB with ModA for recovery. Cues for proximity to FWW and sequencing    Transfers  Transfer: Yes  Transfer 1  Transfer From 1: Sit to, Stand to  Transfer to 1: Stand, Sit  Technique 1: Stand to sit, Sit to stand  Transfer Device 1: Walker  Transfer Level of Assistance 1: Minimum assistance  Trials/Comments 1: Yisel to raise trunk. Pt demos safe hand placement. Cues for eccentric control.    Outcome Measures:  [facility] Basic Mobility  Turning from your back to your side while in a flat bed without using bedrails: A little  Moving from lying on your back to sitting on the side of a flat bed without using bedrails: A little  Moving to and from bed to chair (including a wheelchair): A little  Standing up from a chair using your arms (e.g. wheelchair or bedside chair): A little  To walk in hospital room: A little  Climbing 3-5 steps with railing: Total  Basic Mobility - Total Score: 16    Education Documentation  Mobility Training, taught by Sophia Rice PTA at 7/10/2025 10:36 AM.  Learner: Family, Patient  Readiness: Eager  Method: Explanation, Demonstration  Response: Verbalizes Understanding, Demonstrated Understanding  Comment: POC, fall prevention, calling for assistance    Education Comments  No comments found.      Encounter Problems       Encounter Problems (Active)       PT Problem       Pt will transition supine<>sit with mod I.  (Progressing)       Start:  07/08/25    Expected End:  07/27/25            Pt will transfer sit<>stand with FWW & close S. (Progressing)       Start:  07/08/25    Expected End:  07/27/25            Pt will ambulate >/=25 ft with FWW & CGA. (Progressing)       Start:  07/08/25    Expected End:  07/27/25

## 2025-07-10 NOTE — CARE PLAN
The patient's goals for the shift include comfort and safety maintained    The clinical goals for the shift include maintain safety, increase mobility      Problem: Heart Failure  Goal: Improved gas exchange this shift  Outcome: Progressing  Flowsheets (Taken 7/10/2025 1827)  Improved gas exchange this shift: Position to promote circulation/maximize ventilation  Goal: Improved urinary output this shift  Outcome: Progressing  Flowsheets (Taken 7/10/2025 1827)  Improved urinary output this shift:   Med administration/monitor effect   Monitor intake/output and daily weight   Monitor serum electrolytes/replace per order  Goal: Reduction in peripheral edema within 24 hours  Outcome: Progressing  Flowsheets (Taken 7/10/2025 1827)  Reduction in peripheral edema within 24 hours: Monitor edema/skin/mucous membrane integrity  Goal: Report improvement of dyspnea/breathlessness this shift  Outcome: Progressing  Flowsheets (Taken 7/10/2025 1827)  Report improvement of dyspnea/breathlessness this shift:   Encourage activity/mobility/ROM   Incentive spirometry/deep breathing /HOB elevated elevated   Consider PT/OT consult  Goal: Weight from fluid excess reduced over 2-3 days, then stabilize  Outcome: Progressing  Flowsheets (Taken 7/10/2025 1827)  Weight from fluid excess reduced over 2-3 days, then stabilize:   Monitor bowel elimination   Med administration/monitor effect  Goal: Increase self care and/or family involvement in 24 hours  Outcome: Progressing  Flowsheets (Taken 7/10/2025 1827)  Increase self care and/or family involvement in 24 hours: Facilitate advanced care planning/discussion of treatment options     Problem: Pain - Adult  Goal: Verbalizes/displays adequate comfort level or baseline comfort level  Outcome: Progressing  Flowsheets (Taken 7/10/2025 1827)  Verbalizes/displays adequate comfort level or baseline comfort level:   Encourage patient to monitor pain and request assistance   Administer analgesics based on  type and severity of pain and evaluate response   Consider cultural and social influences on pain and pain management   Assess pain using appropriate pain scale   Implement non-pharmacological measures as appropriate and evaluate response   Notify Licensed Independent Practitioner if interventions unsuccessful or patient reports new pain     Problem: Safety - Adult  Goal: Free from fall injury  Outcome: Progressing  Flowsheets (Taken 7/10/2025 1827)  Free from fall injury:   Instruct family/caregiver on patient safety   Based on caregiver fall risk screen, instruct family/caregiver to ask for assistance with transferring infant if caregiver noted to have fall risk factors     Problem: Discharge Planning  Goal: Discharge to home or other facility with appropriate resources  Outcome: Progressing  Flowsheets (Taken 7/10/2025 1827)  Discharge to home or other facility with appropriate resources:   Identify barriers to discharge with patient and caregiver   Identify discharge learning needs (meds, wound care, etc)   Refer to discharge planning if patient needs post-hospital services based on physician order or complex needs related to functional status, cognitive ability or social support system   Arrange for needed discharge resources and transportation as appropriate   Arrange for interpreters to assist at discharge as needed     Problem: Chronic Conditions and Co-morbidities  Goal: Patient's chronic conditions and co-morbidity symptoms are monitored and maintained or improved  Outcome: Progressing  Flowsheets (Taken 7/10/2025 1827)  Care Plan - Patient's Chronic Conditions and Co-Morbidity Symptoms are Monitored and Maintained or Improved:   Monitor and assess patient's chronic conditions and comorbid symptoms for stability, deterioration, or improvement   Collaborate with multidisciplinary team to address chronic and comorbid conditions and prevent exacerbation or deterioration   Update acute care plan with  appropriate goals if chronic or comorbid symptoms are exacerbated and prevent overall improvement and discharge     Problem: Nutrition  Goal: Nutrient intake appropriate for maintaining nutritional needs  Outcome: Progressing     Problem: Skin  Goal: Decreased wound size/increased tissue granulation at next dressing change  Outcome: Progressing  Flowsheets (Taken 7/9/2025 1019 by Marquise Jonh RN)  Decreased wound size/increased tissue granulation at next dressing change:   Protective dressings over bony prominences   Promote sleep for wound healing  Goal: Participates in plan/prevention/treatment measures  Outcome: Progressing  Flowsheets (Taken 7/9/2025 1019 by Marquise Jonh RN)  Participates in plan/prevention/treatment measures:   Increase activity/out of bed for meals   Elevate heels  Goal: Prevent/manage excess moisture  Outcome: Progressing  Goal: Prevent/minimize sheer/friction injuries  Outcome: Progressing  Goal: Promote/optimize nutrition  Outcome: Progressing  Goal: Promote skin healing  Outcome: Progressing  Flowsheets (Taken 7/9/2025 1019 by Marquise Jonh RN)  Promote skin healing:   Assess skin/pad under line(s)/device(s)   Protective dressings over bony prominences   Turn/reposition every 2 hours/use positioning/transfer devices

## 2025-07-10 NOTE — CARE PLAN
The patient's goals for the shift include comfort and safety maintained    The clinical goals for the shift include safety      Problem: Heart Failure  Goal: Improved gas exchange this shift  Outcome: Progressing  Goal: Improved urinary output this shift  Outcome: Progressing  Goal: Reduction in peripheral edema within 24 hours  Outcome: Progressing  Goal: Report improvement of dyspnea/breathlessness this shift  Outcome: Progressing  Goal: Weight from fluid excess reduced over 2-3 days, then stabilize  Outcome: Progressing  Goal: Increase self care and/or family involvement in 24 hours  Outcome: Progressing     Problem: Pain - Adult  Goal: Verbalizes/displays adequate comfort level or baseline comfort level  Outcome: Progressing     Problem: Safety - Adult  Goal: Free from fall injury  Outcome: Progressing     Problem: Discharge Planning  Goal: Discharge to home or other facility with appropriate resources  Outcome: Progressing     Problem: Chronic Conditions and Co-morbidities  Goal: Patient's chronic conditions and co-morbidity symptoms are monitored and maintained or improved  Outcome: Progressing     Problem: Nutrition  Goal: Nutrient intake appropriate for maintaining nutritional needs  Outcome: Progressing     Problem: Skin  Goal: Decreased wound size/increased tissue granulation at next dressing change  Outcome: Progressing  Goal: Participates in plan/prevention/treatment measures  Outcome: Progressing  Goal: Prevent/manage excess moisture  Outcome: Progressing  Goal: Prevent/minimize sheer/friction injuries  Outcome: Progressing  Goal: Promote/optimize nutrition  Outcome: Progressing  Goal: Promote skin healing  Outcome: Progressing

## 2025-07-11 VITALS
SYSTOLIC BLOOD PRESSURE: 103 MMHG | WEIGHT: 188 LBS | RESPIRATION RATE: 16 BRPM | DIASTOLIC BLOOD PRESSURE: 66 MMHG | BODY MASS INDEX: 25.47 KG/M2 | HEIGHT: 72 IN | OXYGEN SATURATION: 98 % | HEART RATE: 78 BPM | TEMPERATURE: 97.9 F

## 2025-07-11 LAB
ANION GAP SERPL CALCULATED.3IONS-SCNC: 10 MMOL/L (ref 10–20)
ATRIAL RATE: 97 BPM
BUN SERPL-MCNC: 25 MG/DL (ref 6–23)
CALCIUM SERPL-MCNC: 8.4 MG/DL (ref 8.6–10.3)
CHLORIDE SERPL-SCNC: 105 MMOL/L (ref 98–107)
CO2 SERPL-SCNC: 30 MMOL/L (ref 21–32)
CREAT SERPL-MCNC: 1.26 MG/DL (ref 0.5–1.3)
EGFRCR SERPLBLD CKD-EPI 2021: 56 ML/MIN/1.73M*2
GLUCOSE SERPL-MCNC: 89 MG/DL (ref 74–99)
HOLD SPECIMEN: NORMAL
P AXIS: 80 DEGREES
P OFFSET: 195 MS
P ONSET: 144 MS
POTASSIUM SERPL-SCNC: 3.6 MMOL/L (ref 3.5–5.3)
PR INTERVAL: 158 MS
Q ONSET: 223 MS
QRS COUNT: 16 BEATS
QRS DURATION: 118 MS
QT INTERVAL: 386 MS
QTC CALCULATION(BAZETT): 490 MS
QTC FREDERICIA: 452 MS
R AXIS: -47 DEGREES
SODIUM SERPL-SCNC: 141 MMOL/L (ref 136–145)
T AXIS: 81 DEGREES
T OFFSET: 416 MS
VENTRICULAR RATE: 97 BPM

## 2025-07-11 PROCEDURE — 2500000001 HC RX 250 WO HCPCS SELF ADMINISTERED DRUGS (ALT 637 FOR MEDICARE OP): Performed by: NURSE PRACTITIONER

## 2025-07-11 PROCEDURE — 2500000004 HC RX 250 GENERAL PHARMACY W/ HCPCS (ALT 636 FOR OP/ED): Performed by: INTERNAL MEDICINE

## 2025-07-11 PROCEDURE — 80048 BASIC METABOLIC PNL TOTAL CA: CPT | Performed by: INTERNAL MEDICINE

## 2025-07-11 PROCEDURE — 36415 COLL VENOUS BLD VENIPUNCTURE: CPT | Performed by: INTERNAL MEDICINE

## 2025-07-11 PROCEDURE — 99232 SBSQ HOSP IP/OBS MODERATE 35: CPT | Performed by: INTERNAL MEDICINE

## 2025-07-11 RX ADMIN — FUROSEMIDE 20 MG: 20 TABLET ORAL at 07:55

## 2025-07-11 RX ADMIN — CARVEDILOL 3.12 MG: 3.12 TABLET, FILM COATED ORAL at 07:55

## 2025-07-11 RX ADMIN — DAPAGLIFLOZIN 5 MG: 10 TABLET, FILM COATED ORAL at 07:55

## 2025-07-11 RX ADMIN — LISINOPRIL 2.5 MG: 2.5 TABLET ORAL at 07:55

## 2025-07-11 RX ADMIN — HEPARIN SODIUM 5000 UNITS: 5000 INJECTION, SOLUTION INTRAVENOUS; SUBCUTANEOUS at 07:55

## 2025-07-11 ASSESSMENT — COGNITIVE AND FUNCTIONAL STATUS - GENERAL
DRESSING REGULAR LOWER BODY CLOTHING: A LOT
CLIMB 3 TO 5 STEPS WITH RAILING: TOTAL
MOBILITY SCORE: 14
PERSONAL GROOMING: A LOT
EATING MEALS: A LITTLE
DAILY ACTIVITIY SCORE: 13
MOVING TO AND FROM BED TO CHAIR: A LOT
STANDING UP FROM CHAIR USING ARMS: A LOT
DRESSING REGULAR UPPER BODY CLOTHING: A LOT
HELP NEEDED FOR BATHING: A LOT
TOILETING: A LOT
TURNING FROM BACK TO SIDE WHILE IN FLAT BAD: A LITTLE
WALKING IN HOSPITAL ROOM: A LOT

## 2025-07-11 ASSESSMENT — PAIN - FUNCTIONAL ASSESSMENT: PAIN_FUNCTIONAL_ASSESSMENT: 0-10

## 2025-07-11 ASSESSMENT — PAIN SCALES - GENERAL: PAINLEVEL_OUTOF10: 0 - NO PAIN

## 2025-07-11 NOTE — PROGRESS NOTES
07/11/25 1248   Discharge Planning   Who is requesting discharge planning? Provider   Home or Post Acute Services Post acute facilities (Rehab/SNF/etc)   Type of Post Acute Facility Services Skilled nursing   Expected Discharge Disposition SNF  ([facility])   Does the patient need discharge transport arranged? Yes   [facility] coordination needed? Yes   Has discharge transport been arranged? Yes   What day is the transport expected? 07/11/25   What time is the transport expected? 1530     Life vest delivered.   [address] signed. 7000 completed. Transport arranged for 1530 per nurse request. Gave report number to Anna ADAN [phone]. IMM signed.     DISCHARGE PLAN TO GO TO [facility] AT 1530.

## 2025-07-11 NOTE — DOCUMENTATION CLARIFICATION NOTE
"    PATIENT:               RICK CABRAL  ACCT #:                  [id number]  MRN:                       [id number]  :                       1940  ADMIT DATE:       2025 7:43 PM  DISCH DATE:  RESPONDING PROVIDER #:        [id number]          PROVIDER RESPONSE TEXT:    Acute on Chronic Systolic Congestive Heart Failure    CDI QUERY TEXT:    Clarification        Instruction:    Based on your assessment of the patient and the clinical information, please provide the requested documentation by clicking on the appropriate radio button and enter any additional information if prompted.    Question: Please further clarify the type and acuity of congestive heart failure    When answering this query, please exercise your independent professional judgment. The fact that a question is being asked, does not imply that any particular answer is desired or expected.    The patient's clinical indicators include:  Clinical Information:   Rick Cabral is a 85 y.o. male presenting with lower extremity edema.    Clinical Indicators:   ED PN:  \"Patient's workup is consistent with CHF.  He has no history of CHF.  He was treated with Lasix in the ED.\"    - BnP; 1241    - CXR: \"IMPRESSION:  Cardiomegaly with mild pulmonary vascular congestion.Bibasilar  opacities likely relate to small layering effusions and atelectasis...\"    - H/P:  \"Acute on chronic congestive heart failure, unspecified heart failure type\"  \"CHF, acute, new onset.  IV Lasix.  Check echocardiogram.  Fluid restriction. '     Cards Consult:  \"...systolic and diastolic heart failure with last ejection fraction 35 to 40% in .\"  \"Patient with history of heart failure moderate LV style dysfunction, hypertension presents to the hospital with acute decompensated heart failure.  Acute on chronic heart failure systolic dysfunction.'     Cards PN:  \"... Certainly appears to have an exacerbation of systolic and diastolic heart failure.\"     TTE:  " "\"Left Ventricle: The left ventricular systolic function is severely decreased with a visually estimated ejection fraction of 20-25%.There is global hypokinesis of the left ventricle with minor regional variations.The left ventricular cavity size is normal.There is mild increased septal and mildly increased posterior left ventricular wall thickness.There is left ventricular concentric remodeling.Spectral Doppler shows a Grade II (pseudonormal pattern) of left ventricular diastolic filling with an elevated left atrial pressure.  Left Atrium: The left atrial size is mildly dilated.  Right Ventricle: The right ventricle is normal in size.There is reduced right ventricular systolic function.  Right Atrium: The right atrial size is mild to moderately dilated.\"    Treatment:  CXR; BNP; Lasix 40 mg IV; Cards Consult; Cardiac Rehab; ACE; Beta-blocker;    Risk Factors:  Age; HTN; Dyslipidemia;  Options provided:  -- Acute Systolic Congestive Heart Failure  -- Acute on Chronic Systolic Congestive Heart Failure  -- Chronic Systolic Congestive Heart Failure  -- Acute Diastolic Congestive Heart Failure  -- Acute on Chronic Diastolic Congestive Heart Failure  -- Chronic Diastolic Congestive Heart Failure  -- Acute combined systolic/diastolic Congestive Heart Failure  -- Acute on Chronic combined systolic/diastolic Congestive Heart Failure  -- Chronic combined systolic/diastolic Congestive Heart Failure  -- Other - I will add my own diagnosis  -- Refer to Clinical Documentation Reviewer    Query created by: Imelda Norris on 7/10/2025 11:52 AM      Electronically signed by:  KAREN DARNELL MD 7/11/2025 2:30 PM          "

## 2025-07-11 NOTE — PROGRESS NOTES
Rick Cabral is a 85 y.o. male on day 3 of admission presenting with Acute on chronic congestive heart failure, unspecified heart failure type.      Subjective     Patient denied chest pain.  He had swelling both lower extremities.       Objective     Last Recorded Vitals  /79 (BP Location: Right arm, Patient Position: Lying)   Pulse 76   Temp 36.6 °C (97.9 °F) (Oral)   Resp 18   Wt 85.3 kg (188 lb)   SpO2 96%   Intake/Output last 3 Shifts:    Intake/Output Summary (Last 24 hours) at 7/11/2025 1011  Last data filed at 7/11/2025 0655  Gross per 24 hour   Intake 425 ml   Output 950 ml   Net -525 ml       Admission Weight  Weight: 85.3 kg (188 lb) (07/07/25 1932)    Daily Weight  07/07/25 : 85.3 kg (188 lb)    Image Results  Transthoracic Echo [place] [address]             Phone [phone]    TRANSTHORACIC ECHOCARDIOGRAM REPORT    Patient Name:       RICK CABRAL         Reading Physician:    [id number] Sanchez George MD  Study Date:         7/8/2025             Ordering Provider:    [id number] NAM CARRASCO  MRN/PID:            [id number]             Fellow:  Accession#:         [id number]         Nurse:  Date of Birth/Age:  1940 / 85 years Sonographer:          Carley Craft RDCS  Gender Assigned at  M                    Additional Staff:  Birth:  Height:             182.88 cm            Admit Date:  Weight:             85.28 kg             Admission Status:     Inpatient -                                                                 Routine  BSA / BMI:          2.08 m2 / 25.50      Department Location:  [facility]                      kg/m2  Blood Pressure: 148 /92 mmHg    Study Type:    TRANSTHORACIC ECHO (TTE) COMPLETE  Diagnosis/ICD:  Heart failure, unspecified-I50.9  Indication:    Acute congestive heart failure  CPT Codes:     Echo Complete w Full [id number]    Patient History:  Pertinent History: CHF, HTN and Stroke Smoker.    Study Detail: Definity used as a contrast agent for endocardial border                definition. Total contrast used for this procedure was 2 mL via IV                push.       PHYSICIAN INTERPRETATION:  Left Ventricle: The left ventricular systolic function is severely decreased with a visually estimated ejection fraction of 20-25%. There is global hypokinesis of the left ventricle with minor regional variations. The left ventricular cavity size is normal. There is mild increased septal and mildly increased posterior left ventricular wall thickness. There is left ventricular concentric remodeling. Spectral Doppler shows a Grade II (pseudonormal pattern) of left ventricular diastolic filling with an elevated left atrial pressure.  Left Atrium: The left atrial size is mildly dilated.  Right Ventricle: The right ventricle is normal in size. There is reduced right ventricular systolic function.  Right Atrium: The right atrial size is mild to moderately dilated.  Aortic Valve: The aortic valve was not well visualized. The aortic valve area by VTI is 1.61 cmï¿½ with a peak velocity of 1.06 m/s. The peak and mean gradients are 4 mmHg and 2 mmHg, respectively, with a dimensionless index of 0.51. There is no evidence of aortic valve regurgitation.  Mitral Valve: The mitral valve is normal in structure. The doppler estimated peak and mean diastolic gradients are 2 mmHg and 1 mmHg, respectively. There is mild mitral valve regurgitation. The E Vmax is 0.86 m/s. The mitral regurgitant orifice area is 7 mm2. The mitral regurgitant volume is 10.81 ml.  Tricuspid Valve: The tricuspid valve is structurally normal. There is mild tricuspid regurgitation. The Doppler estimated right ventricular systolic pressure (RVSP) is mildly  elevated at 42 mmHg.  Pulmonic Valve: The pulmonic valve is structurally normal. There is no indication of pulmonic valve regurgitation.  Pericardium: Trivial pericardial effusion.  Aorta: The aortic root is normal. There is evidence of mild ascending aortic aneurysm of 41 mm at the largest diameter.  Systemic Veins: The inferior vena cava appears mildly dilated, with IVC inspiratory collapse greater than 50%.  In comparison to the previous echocardiogram(s): Compared with study dated 3/1/2023,.       CONCLUSIONS:   1. Poorly visualized anatomical structures due to suboptimal image quality.   2. The left ventricular systolic function is severely decreased with a visually estimated ejection fraction of 20-25%.   3. There is global hypokinesis of the left ventricle with minor regional variations.   4. Spectral Doppler shows a Grade II (pseudonormal pattern) of left ventricular diastolic filling with an elevated left atrial pressure.   5. There is reduced right ventricular systolic function.   6. The right atrial size is mild to moderately dilated.   7. Mild mitral valve regurgitation.   8. Mild tricuspid regurgitation is visualized.   9. The Doppler estimated RVSP is mildly elevated at 42 mmHg.  10. There is evidence of mild ascending aortic aneurysm of 41 mm at the largest diameter.  11. The inferior vena cava appears mildly dilated, with IVC inspiratory collapse greater than 50%.    QUANTITATIVE DATA SUMMARY:     2D MEASUREMENTS:             Normal Ranges:  LAs:             3.90 cm     (2.7-4.0cm)  IVSd:            1.09 cm     (0.6-1.1cm)  LVPWd:           1.16 cm     (0.6-1.1cm)  LVIDd:           5.26 cm     (3.9-5.9cm)  LVIDs:           4.39 cm  LV Mass Index:   111.8 g/m2  LVEDV Index:     80.98 ml/m2  LV % FS          16.5 %       LEFT ATRIUM:                  Normal Ranges:  LA Vol A4C:        75.2 ml    (22+/-6mL/m2)  LA Vol A2C:        105.5 ml  LA Vol BP:         90.5 ml  LA Vol Index A4C:  36.2ml/m2  LA Vol  Index A2C:  50.8 ml/m2  LA Vol Index BP:   43.6 ml/m2  LA Area A4C:       22.1 cm2  LA Area A2C:       26.6 cm2  LA Major Axis A4C: 5.5 cm  LA Major Axis A2C: 5.7 cm  LA Vol A4C:        71.1 ml  LA Vol A2C:        97.1 ml  LA Vol Index BSA:  40.5 ml/m2       RIGHT ATRIUM:                 Normal Ranges:  RA Vol A4C:        68.7 ml    (8.3-19.5ml)  RA Vol Index A4C:  33.1 ml/m2  RA Area A4C:       19.9 cm2  RA Major Axis A4C: 4.9 cm       M-MODE MEASUREMENTS:         Normal Ranges:  Ao Root:             3.70 cm (2.0-3.7cm)  AoV Exc:             1.50 cm (1.5-2.5cm)  LAs:                 3.80 cm (2.7-4.0cm)       AORTA MEASUREMENTS:         Normal Ranges:  AoV Exc:            1.50 cm (1.5-2.5cm)  Asc Ao, d:          4.10 cm (2.1-3.4cm)       LV SYSTOLIC FUNCTION:                       Normal Ranges:  EF-A4C View:    28 % (>=55%)  EF-A2C View:    15 %  EF-Biplane:     23 %  EF-Visual:      23 %  LV EF Reported: 23 %       LV DIASTOLIC FUNCTION:             Normal Ranges:  MV Peak E:             0.86 m/s    (0.7-1.2 m/s)  MV Peak A:             0.68 m/s    (0.42-0.7 m/s)  E/A Ratio:             1.27        (1.0-2.2)  PulmV Sys Rakesh:         41.70 cm/s  PulmV Miguel Rakesh:        36.70 cm/s  PulmV S/D Rakesh:         1.10  PulmV A Revs Rakesh:      23.60 cm/s  PulmV A Revs Dur:      121.00 msec       MITRAL VALVE:          Normal Ranges:  MV Vmax:      0.77 m/s (<=1.3m/s)  MV peak P.4 mmHg (<5mmHg)  MV mean P.0 mmHg (<48mmHg)  MV DT:        173 msec (150-240msec)       MITRAL INSUFFICIENCY:             Normal Ranges:  PISA Radius:          0.4 cm  MR VTI:               155.00 cm  MR Vmax:              444.00 cm/s  MR Alias Rakesh:         30.8 cm/s  MR Volume:            10.81 ml  MR Flow Rt:           30.96 ml/s  MR EROA:              7 mm2       AORTIC VALVE:                     Normal Ranges:  AoV Vmax:                1.06 m/s (<=1.7m/s)  AoV Peak P.5 mmHg (<20mmHg)  AoV Mean P.0 mmHg  (1.7-11.5mmHg)  LVOT Max Rakesh:            0.61 m/s (<=1.1m/s)  AoV VTI:                 21.50 cm (18-25cm)  LVOT VTI:                11.00 cm  LVOT Diameter:           2.00 cm  (1.8-2.4cm)  AoV Area, VTI:           1.61 cm2 (2.5-5.5cm2)  AoV Area,Vmax:           1.81 cm2 (2.5-4.5cm2)  AoV Dimensionless Index: 0.51       RIGHT VENTRICLE:  RV Basal 4.04 cm  RV Mid   2.66 cm  RV Major 9.7 cm  TAPSE:   20.9 mm       TRICUSPID VALVE/RVSP:          Normal Ranges:  Peak TR Velocity:     2.91 m/s  Est. RA Pressure:     8 mmHg  RV Syst Pressure:     42 mmHg  (< 30mmHg)  IVC Diam:             2.20 cm       PULMONIC VALVE:          Normal Ranges:  PV Max Rakesh:     0.7 m/s  (0.6-0.9m/s)  PV Max P.2 mmHg       PULMONARY VEINS:  PulmV A Revs Dur: 121.00 msec  PulmV A Revs Rakesh: 23.60 cm/s  PulmV Miguel Rakesh:   36.70 cm/s  PulmV S/D Rakesh:    1.10  PulmV Sys Rakesh:    41.70 cm/s       [id number] Sanchez George MD  Electronically signed on 2025 at 1:52:41 PM       ** Final **      Physical Exam    General:  cooperating during physical exam.  HEENT: Pupils are equal and reactive to light and commendation , oral mucosa moist, no JVD.  Slight facial asymmetry from previous stroke  Cardiovascular:  PMI nondisplaced  Lungs: Clear to auscultation bilaterally, no wheezing, no crackles, no dullness to percussion.  Abdomen: No hepatosplenomegaly appreciated, soft , not tender, positive bowel sounds, positive bowel movement.  Neuro: Alert and oriented x2, strength in upper and lower extremities , sensation intact.  Psych: Patient had great insight was going on  Musculoskeletal: +1 pedal edema both lower extremities   vascular: Pulses are intact in upper and lower extremities  Skin: No petechiae, ecchymosis or other stigmata for dermatology disease.     Assessment and plan    Acute on chronic congestive heart failure with systolic dysfunction  Continue with Lasix   Patient is on lisinopril and Coreg.  Cardiology on case.  2D echo revealed  ejection fraction 25 - 30%.  Family and the patient refused cardiac cath.  Family and patient understood importance of not having cardiac cath.  Discussed with cardiology on the case.  Patient will be discharged to skilled nursing facility with [facility]    Shortness of breath  Secondary to acute congestive heart failure  Clinically improved    History of stroke  Patient had stroke in 2022    Hypertension  Continue with current medication.    Deconditioning  Fall precaution  Ambulate with assistance  PT/ OT evaluated the patient  Moderate intensity level of care  Plan to discharge to skilled nursing facility today    DVT prophylaxis    Patient is clinically hemodynamic stable to get discharged today  Paulette López MD

## 2025-07-11 NOTE — PROGRESS NOTES
Spiritual Care Visit  Spiritual Care Request    Reason for Visit:  Routine Visit: Introduction     Request Received From:       Focus of Care:  Visited With: Patient         Refer to :          Spiritual Care Assessment    Spiritual Assessment:                      Care Provided:  Intended Effects: Convey a calming presence, Demonstrate caring and concern, Helping someone feel comforted, Lessen someone's feelings of isolation, Promote sense of peace  Methods: Assist with finding purpose, Explore nancy and values  Interventions: Active listening, Ask guided questions, Ask guided questions about cultural and Alevism values, Ask questions to bring forth feelings, Explain  role    Sense of Community and or Hindu Affiliation:  Congregational         Addressed Needs/Concerns and/or Allan Through:  Hindu Encounters  Hindu Needs: Prayer       Outcome:        Advance Directives:         Spiritual Care Annotation    Annotation:  provided patient support while rounding the Unit.  introduced  services of [facility].   explained the role of the  in providing emotional and spiritual support for patient's and family while in admitted to the hospital.      greeted the patient who was resting flat in the hospital bed. Appears comfortable and pain free. Patient was sleeping and awoke when his name was called. Patient welcomed the spiritual care visit. Patient stated that he has a low heart rate which affects his balance. Patient talked about his career as .  offered blessing and words of comfort.  Prayer was offered as requested.     No other spiritual or Alevism needs were expressed. Spiritual care will remain available for support as requested.

## 2025-07-11 NOTE — CARE PLAN
The patient's goals for the shift include comfort and safety maintained    The clinical goals for the shift include REST      Problem: Heart Failure  Goal: Improved gas exchange this shift  Outcome: Progressing  Goal: Improved urinary output this shift  Outcome: Progressing  Goal: Reduction in peripheral edema within 24 hours  Outcome: Progressing  Goal: Report improvement of dyspnea/breathlessness this shift  Outcome: Progressing  Goal: Weight from fluid excess reduced over 2-3 days, then stabilize  Outcome: Progressing  Goal: Increase self care and/or family involvement in 24 hours  Outcome: Progressing     Problem: Pain - Adult  Goal: Verbalizes/displays adequate comfort level or baseline comfort level  Outcome: Progressing     Problem: Safety - Adult  Goal: Free from fall injury  Outcome: Progressing     Problem: Discharge Planning  Goal: Discharge to home or other facility with appropriate resources  Outcome: Progressing     Problem: Chronic Conditions and Co-morbidities  Goal: Patient's chronic conditions and co-morbidity symptoms are monitored and maintained or improved  Outcome: Progressing     Problem: Nutrition  Goal: Nutrient intake appropriate for maintaining nutritional needs  Outcome: Progressing     Problem: Skin  Goal: Decreased wound size/increased tissue granulation at next dressing change  Outcome: Progressing  Flowsheets (Taken 7/11/2025 1007)  Decreased wound size/increased tissue granulation at next dressing change: Promote sleep for wound healing  Goal: Participates in plan/prevention/treatment measures  Outcome: Progressing  Flowsheets (Taken 7/11/2025 1007)  Participates in plan/prevention/treatment measures: Discuss with provider PT/OT consult  Goal: Prevent/manage excess moisture  Outcome: Progressing  Flowsheets (Taken 7/11/2025 1007)  Prevent/manage excess moisture: Monitor for/manage infection if present  Goal: Prevent/minimize sheer/friction injuries  Outcome: Progressing  Flowsheets  (Taken 7/11/2025 1007)  Prevent/minimize sheer/friction injuries: Use pull sheet  Goal: Promote/optimize nutrition  Outcome: Progressing  Flowsheets (Taken 7/11/2025 1007)  Promote/optimize nutrition: Monitor/record intake including meals  Goal: Promote skin healing  Outcome: Progressing  Flowsheets (Taken 7/11/2025 1007)  Promote skin healing: Turn/reposition every 2 hours/use positioning/transfer devices

## 2025-07-15 ENCOUNTER — PATIENT OUTREACH (OUTPATIENT)
Dept: CASE MANAGEMENT | Facility: HOSPITAL | Age: 85
End: 2025-07-15
Payer: OTHER GOVERNMENT

## 2025-07-15 NOTE — PROGRESS NOTES
Heart Failure Nurse Navigator Transition of Care Phone Call    The role of the HF nurse navigator is to (1) characterize risk profiles of patients with heart failure transitioning from [id number] after hospitalization, (2) recommend interventions to improve care and reduce risks of worse post-hospitalization outcomes.     This HF Nurse Navigator called out to [facility] to ensure patient is on HF protocol, low sodium diet and daily wts, and check on progress.     Per [facility], pt is on daily wts and they do not add salt to any of their diets. He is also wearing his prescribed LifeVest.    Augusta Bynum RN BSN  [facility] Clinical Nurse Navigator, CHF  [phone]

## 2025-07-30 ENCOUNTER — PATIENT OUTREACH (OUTPATIENT)
Dept: CASE MANAGEMENT | Facility: HOSPITAL | Age: 85
End: 2025-07-30

## 2025-07-30 NOTE — PROGRESS NOTES
Heart Failure Nurse Navigator Transition of Care Phone Call    The role of the HF nurse navigator is to (1) characterize risk profiles of patients with heart failure transitioning from [id number] after hospitalization, (2) recommend interventions to improve care and reduce risks of worse post-hospitalization outcomes.     This HF Nurse Navigator called out to [facility] to ensure patient is on HF protocol, low sodium diet and daily wts, and check on progress.   Per [place] pt was DC home 7/24/25.    Heart Failure Nurse Navigator Transition of Care Phone Call    The role of the HF nurse navigator is to (1) characterize risk profiles of patients with heart failure transitioning from [id number] after hospitalization, (2) recommend interventions to improve care and reduce risks of worse post-hospitalization outcomes.     HF Nurse Navigator outreach to patient's daughter, Heather, via phone call to review HF education and check on progress. No answer, message left with contact information to return my call.     Augusta Bynum RN BSN  [facility] Clinical Nurse Navigator, CHF  [phone]

## 2025-08-06 ENCOUNTER — PATIENT OUTREACH (OUTPATIENT)
Dept: CASE MANAGEMENT | Facility: HOSPITAL | Age: 85
End: 2025-08-06

## 2025-08-06 NOTE — PROGRESS NOTES
Heart Failure Nurse Navigator Transition of Care Phone Call    The role of the HF nurse navigator is to (1) characterize risk profiles of patients with heart failure transitioning from [id number] after hospitalization, (2) recommend interventions to improve care and reduce risks of worse post-hospitalization outcomes.     HF Nurse Navigator outreach to patient's daughter, Dora, via phone call to review HF education and check on progress. No answer, message left with contact information to return my call.     Augusta Bynum RN BSN  [facility] Clinical Nurse Navigator, CHF  [phone]

## 2025-08-15 ENCOUNTER — PATIENT OUTREACH (OUTPATIENT)
Dept: CASE MANAGEMENT | Facility: HOSPITAL | Age: 85
End: 2025-08-15